# Patient Record
Sex: FEMALE | Race: WHITE | NOT HISPANIC OR LATINO | Employment: FULL TIME | URBAN - METROPOLITAN AREA
[De-identification: names, ages, dates, MRNs, and addresses within clinical notes are randomized per-mention and may not be internally consistent; named-entity substitution may affect disease eponyms.]

---

## 2024-06-24 ENCOUNTER — APPOINTMENT (EMERGENCY)
Dept: RADIOLOGY | Facility: HOSPITAL | Age: 64
DRG: 470 | End: 2024-06-24
Payer: COMMERCIAL

## 2024-06-24 ENCOUNTER — HOSPITAL ENCOUNTER (INPATIENT)
Facility: HOSPITAL | Age: 64
LOS: 4 days | Discharge: HOME | DRG: 470 | End: 2024-06-28
Attending: STUDENT IN AN ORGANIZED HEALTH CARE EDUCATION/TRAINING PROGRAM | Admitting: STUDENT IN AN ORGANIZED HEALTH CARE EDUCATION/TRAINING PROGRAM
Payer: COMMERCIAL

## 2024-06-24 DIAGNOSIS — M80.00XP AGE-RELATED OSTEOPOROSIS WITH CURRENT PATHOLOGICAL FRACTURE WITH MALUNION: ICD-10-CM

## 2024-06-24 DIAGNOSIS — S72.002A LEFT DISPLACED FEMORAL NECK FRACTURE (HCC): ICD-10-CM
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
## 2024-06-24 DIAGNOSIS — S72.002A DISPLACED FRACTURE OF LEFT FEMORAL NECK (HCC): ICD-10-CM

## 2024-06-24 DIAGNOSIS — S72.009A FEMORAL NECK FRACTURE (HCC): ICD-10-CM

## 2024-06-24 LAB
ANION GAP SERPL CALCULATED.3IONS-SCNC: 12 MMOL/L (ref 4–13)
BASOPHILS # BLD AUTO: 0.05 THOUSANDS/ÂΜL (ref 0–0.1)
BASOPHILS NFR BLD AUTO: 1 % (ref 0–1)
BUN SERPL-MCNC: 32 MG/DL (ref 5–25)
CALCIUM SERPL-MCNC: 10 MG/DL (ref 8.4–10.2)
CHLORIDE SERPL-SCNC: 107 MMOL/L (ref 96–108)
CO2 SERPL-SCNC: 23 MMOL/L (ref 21–32)
CREAT SERPL-MCNC: 1.16 MG/DL (ref 0.6–1.3)
EOSINOPHIL # BLD AUTO: 0.03 THOUSAND/ÂΜL (ref 0–0.61)
EOSINOPHIL NFR BLD AUTO: 0 % (ref 0–6)
ERYTHROCYTE [DISTWIDTH] IN BLOOD BY AUTOMATED COUNT: 12.8 % (ref 11.6–15.1)
GFR SERPL CREATININE-BSD FRML MDRD: 50 ML/MIN/1.73SQ M
GLUCOSE SERPL-MCNC: 114 MG/DL (ref 65–140)
HCT VFR BLD AUTO: 45.3 % (ref 34.8–46.1)
HGB BLD-MCNC: 15.6 G/DL (ref 11.5–15.4)
IMM GRANULOCYTES # BLD AUTO: 0.02 THOUSAND/UL (ref 0–0.2)
IMM GRANULOCYTES NFR BLD AUTO: 0 % (ref 0–2)
LYMPHOCYTES # BLD AUTO: 1.51 THOUSANDS/ÂΜL (ref 0.6–4.47)
LYMPHOCYTES NFR BLD AUTO: 14 % (ref 14–44)
MCH RBC QN AUTO: 30.9 PG (ref 26.8–34.3)
MCHC RBC AUTO-ENTMCNC: 34.4 G/DL (ref 31.4–37.4)
MCV RBC AUTO: 90 FL (ref 82–98)
MONOCYTES # BLD AUTO: 1.03 THOUSAND/ÂΜL (ref 0.17–1.22)
MONOCYTES NFR BLD AUTO: 10 % (ref 4–12)
NEUTROPHILS # BLD AUTO: 8.22 THOUSANDS/ÂΜL (ref 1.85–7.62)
NEUTS SEG NFR BLD AUTO: 75 % (ref 43–75)
NRBC BLD AUTO-RTO: 0 /100 WBCS
PLATELET # BLD AUTO: 248 THOUSANDS/UL (ref 149–390)
PMV BLD AUTO: 11.5 FL (ref 8.9–12.7)
POTASSIUM SERPL-SCNC: 3.8 MMOL/L (ref 3.5–5.3)
RBC # BLD AUTO: 5.05 MILLION/UL (ref 3.81–5.12)
SODIUM SERPL-SCNC: 142 MMOL/L (ref 135–147)
WBC # BLD AUTO: 10.86 THOUSAND/UL (ref 4.31–10.16)

## 2024-06-24 PROCEDURE — 36415 COLL VENOUS BLD VENIPUNCTURE: CPT | Performed by: STUDENT IN AN ORGANIZED HEALTH CARE EDUCATION/TRAINING PROGRAM

## 2024-06-24 PROCEDURE — 99285 EMERGENCY DEPT VISIT HI MDM: CPT | Performed by: STUDENT IN AN ORGANIZED HEALTH CARE EDUCATION/TRAINING PROGRAM

## 2024-06-24 PROCEDURE — 96374 THER/PROPH/DIAG INJ IV PUSH: CPT

## 2024-06-24 PROCEDURE — 73552 X-RAY EXAM OF FEMUR 2/>: CPT

## 2024-06-24 PROCEDURE — 85025 COMPLETE CBC W/AUTO DIFF WBC: CPT | Performed by: STUDENT IN AN ORGANIZED HEALTH CARE EDUCATION/TRAINING PROGRAM

## 2024-06-24 PROCEDURE — 80048 BASIC METABOLIC PNL TOTAL CA: CPT | Performed by: STUDENT IN AN ORGANIZED HEALTH CARE EDUCATION/TRAINING PROGRAM

## 2024-06-24 PROCEDURE — 99285 EMERGENCY DEPT VISIT HI MDM: CPT

## 2024-06-24 PROCEDURE — 73590 X-RAY EXAM OF LOWER LEG: CPT

## 2024-06-24 PROCEDURE — 70450 CT HEAD/BRAIN W/O DYE: CPT

## 2024-06-24 PROCEDURE — 99221 1ST HOSP IP/OBS SF/LOW 40: CPT | Performed by: FAMILY MEDICINE

## 2024-06-24 PROCEDURE — 72192 CT PELVIS W/O DYE: CPT

## 2024-06-24 PROCEDURE — 96375 TX/PRO/DX INJ NEW DRUG ADDON: CPT

## 2024-06-24 RX ORDER — ACETAMINOPHEN 325 MG/1
650 TABLET ORAL EVERY 6 HOURS PRN
Status: DISCONTINUED | OUTPATIENT
Start: 2024-06-24 | End: 2024-06-27

## 2024-06-24 RX ORDER — ACETAMINOPHEN 10 MG/ML
1000 INJECTION, SOLUTION INTRAVENOUS ONCE
Status: COMPLETED | OUTPATIENT
Start: 2024-06-24 | End: 2024-06-24

## 2024-06-24 RX ORDER — FENTANYL CITRATE 50 UG/ML
100 INJECTION, SOLUTION INTRAMUSCULAR; INTRAVENOUS ONCE
Status: COMPLETED | OUTPATIENT
Start: 2024-06-24 | End: 2024-06-24

## 2024-06-24 RX ORDER — HYDROMORPHONE HCL/PF 1 MG/ML
0.5 SYRINGE (ML) INJECTION ONCE
Status: COMPLETED | OUTPATIENT
Start: 2024-06-24 | End: 2024-06-24

## 2024-06-24 RX ORDER — MORPHINE SULFATE 4 MG/ML
4 INJECTION, SOLUTION INTRAMUSCULAR; INTRAVENOUS EVERY 4 HOURS PRN
Status: DISCONTINUED | OUTPATIENT
Start: 2024-06-24 | End: 2024-06-27

## 2024-06-24 RX ORDER — HEPARIN SODIUM 5000 [USP'U]/ML
5000 INJECTION, SOLUTION INTRAVENOUS; SUBCUTANEOUS EVERY 8 HOURS SCHEDULED
Status: DISCONTINUED | OUTPATIENT
Start: 2024-06-24 | End: 2024-06-26

## 2024-06-24 RX ORDER — ONDANSETRON 2 MG/ML
4 INJECTION INTRAMUSCULAR; INTRAVENOUS EVERY 6 HOURS PRN
Status: DISCONTINUED | OUTPATIENT
Start: 2024-06-24 | End: 2024-06-28 | Stop reason: HOSPADM

## 2024-06-24 RX ADMIN — ACETAMINOPHEN 1000 MG: 10 INJECTION INTRAVENOUS at 17:01

## 2024-06-24 RX ADMIN — MORPHINE SULFATE 2 MG: 2 INJECTION, SOLUTION INTRAMUSCULAR; INTRAVENOUS at 22:31

## 2024-06-24 RX ADMIN — HEPARIN SODIUM 5000 UNITS: 5000 INJECTION, SOLUTION INTRAVENOUS; SUBCUTANEOUS at 22:32

## 2024-06-24 RX ADMIN — HYDROMORPHONE HYDROCHLORIDE 0.5 MG: 1 INJECTION, SOLUTION INTRAMUSCULAR; INTRAVENOUS; SUBCUTANEOUS at 17:29

## 2024-06-24 RX ADMIN — FENTANYL CITRATE 100 MCG: 50 INJECTION, SOLUTION INTRAMUSCULAR; INTRAVENOUS at 17:02

## 2024-06-24 NOTE — Clinical Note
Case was discussed with  and the patient's admission status was agreed to be Admission Status: observation status to the service of Dr. Chase

## 2024-06-25 ENCOUNTER — APPOINTMENT (INPATIENT)
Dept: RADIOLOGY | Facility: HOSPITAL | Age: 64
DRG: 470 | End: 2024-06-25
Payer: COMMERCIAL

## 2024-06-25 ENCOUNTER — ANESTHESIA EVENT (INPATIENT)
Dept: PERIOP | Facility: HOSPITAL | Age: 64
DRG: 470 | End: 2024-06-25
Payer: COMMERCIAL

## 2024-06-25 LAB
25(OH)D3 SERPL-MCNC: 33.9 NG/ML (ref 30–100)
ABO GROUP BLD: NORMAL
ABO GROUP BLD: NORMAL
ANION GAP SERPL CALCULATED.3IONS-SCNC: 5 MMOL/L (ref 4–13)
BLD GP AB SCN SERPL QL: NEGATIVE
BUN SERPL-MCNC: 27 MG/DL (ref 5–25)
CALCIUM SERPL-MCNC: 9 MG/DL (ref 8.4–10.2)
CHLORIDE SERPL-SCNC: 111 MMOL/L (ref 96–108)
CO2 SERPL-SCNC: 27 MMOL/L (ref 21–32)
CREAT SERPL-MCNC: 0.91 MG/DL (ref 0.6–1.3)
ERYTHROCYTE [DISTWIDTH] IN BLOOD BY AUTOMATED COUNT: 12.6 % (ref 11.6–15.1)
GFR SERPL CREATININE-BSD FRML MDRD: 67 ML/MIN/1.73SQ M
GLUCOSE SERPL-MCNC: 113 MG/DL (ref 65–140)
HCT VFR BLD AUTO: 41.7 % (ref 34.8–46.1)
HGB BLD-MCNC: 14 G/DL (ref 11.5–15.4)
INR PPP: 1.12 (ref 0.84–1.19)
MAGNESIUM SERPL-MCNC: 2 MG/DL (ref 1.9–2.7)
MCH RBC QN AUTO: 31 PG (ref 26.8–34.3)
MCHC RBC AUTO-ENTMCNC: 33.6 G/DL (ref 31.4–37.4)
MCV RBC AUTO: 93 FL (ref 82–98)
PLATELET # BLD AUTO: 202 THOUSANDS/UL (ref 149–390)
PMV BLD AUTO: 11 FL (ref 8.9–12.7)
POTASSIUM SERPL-SCNC: 3.7 MMOL/L (ref 3.5–5.3)
PROTHROMBIN TIME: 14.6 SECONDS (ref 11.6–14.5)
RBC # BLD AUTO: 4.51 MILLION/UL (ref 3.81–5.12)
RH BLD: POSITIVE
RH BLD: POSITIVE
SODIUM SERPL-SCNC: 143 MMOL/L (ref 135–147)
SPECIMEN EXPIRATION DATE: NORMAL
WBC # BLD AUTO: 7.43 THOUSAND/UL (ref 4.31–10.16)

## 2024-06-25 PROCEDURE — 86900 BLOOD TYPING SEROLOGIC ABO: CPT | Performed by: PHYSICIAN ASSISTANT

## 2024-06-25 PROCEDURE — 80048 BASIC METABOLIC PNL TOTAL CA: CPT | Performed by: FAMILY MEDICINE

## 2024-06-25 PROCEDURE — 85610 PROTHROMBIN TIME: CPT | Performed by: FAMILY MEDICINE

## 2024-06-25 PROCEDURE — 93005 ELECTROCARDIOGRAM TRACING: CPT

## 2024-06-25 PROCEDURE — 99223 1ST HOSP IP/OBS HIGH 75: CPT | Performed by: PHYSICIAN ASSISTANT

## 2024-06-25 PROCEDURE — 83735 ASSAY OF MAGNESIUM: CPT | Performed by: FAMILY MEDICINE

## 2024-06-25 PROCEDURE — 82306 VITAMIN D 25 HYDROXY: CPT

## 2024-06-25 PROCEDURE — 86850 RBC ANTIBODY SCREEN: CPT | Performed by: PHYSICIAN ASSISTANT

## 2024-06-25 PROCEDURE — 85027 COMPLETE CBC AUTOMATED: CPT | Performed by: FAMILY MEDICINE

## 2024-06-25 PROCEDURE — 86901 BLOOD TYPING SEROLOGIC RH(D): CPT | Performed by: PHYSICIAN ASSISTANT

## 2024-06-25 PROCEDURE — 99232 SBSQ HOSP IP/OBS MODERATE 35: CPT

## 2024-06-25 RX ORDER — LANOLIN ALCOHOL/MO/W.PET/CERES
1 CREAM (GRAM) TOPICAL
Status: DISCONTINUED | OUTPATIENT
Start: 2024-06-26 | End: 2024-06-28 | Stop reason: HOSPADM

## 2024-06-25 RX ORDER — SODIUM CHLORIDE 9 MG/ML
75 INJECTION, SOLUTION INTRAVENOUS CONTINUOUS
Status: DISPENSED | OUTPATIENT
Start: 2024-06-25 | End: 2024-06-26

## 2024-06-25 RX ORDER — KETOROLAC TROMETHAMINE 30 MG/ML
15 INJECTION, SOLUTION INTRAMUSCULAR; INTRAVENOUS EVERY 6 HOURS SCHEDULED
Status: DISCONTINUED | OUTPATIENT
Start: 2024-06-25 | End: 2024-06-27

## 2024-06-25 RX ADMIN — HEPARIN SODIUM 5000 UNITS: 5000 INJECTION, SOLUTION INTRAVENOUS; SUBCUTANEOUS at 22:28

## 2024-06-25 RX ADMIN — SODIUM CHLORIDE 75 ML/HR: 0.9 INJECTION, SOLUTION INTRAVENOUS at 09:34

## 2024-06-25 RX ADMIN — MORPHINE SULFATE 4 MG: 4 INJECTION INTRAVENOUS at 19:17

## 2024-06-25 RX ADMIN — KETOROLAC TROMETHAMINE 15 MG: 30 INJECTION, SOLUTION INTRAMUSCULAR at 23:41

## 2024-06-25 RX ADMIN — MORPHINE SULFATE 2 MG: 2 INJECTION, SOLUTION INTRAMUSCULAR; INTRAVENOUS at 03:38

## 2024-06-25 RX ADMIN — KETOROLAC TROMETHAMINE 15 MG: 30 INJECTION, SOLUTION INTRAMUSCULAR at 17:55

## 2024-06-25 RX ADMIN — SODIUM CHLORIDE 75 ML/HR: 0.9 INJECTION, SOLUTION INTRAVENOUS at 22:30

## 2024-06-25 RX ADMIN — HEPARIN SODIUM 5000 UNITS: 5000 INJECTION, SOLUTION INTRAVENOUS; SUBCUTANEOUS at 13:00

## 2024-06-25 RX ADMIN — HEPARIN SODIUM 5000 UNITS: 5000 INJECTION, SOLUTION INTRAVENOUS; SUBCUTANEOUS at 05:02

## 2024-06-25 RX ADMIN — KETOROLAC TROMETHAMINE 15 MG: 30 INJECTION, SOLUTION INTRAMUSCULAR at 13:00

## 2024-06-25 RX ADMIN — MORPHINE SULFATE 2 MG: 2 INJECTION, SOLUTION INTRAMUSCULAR; INTRAVENOUS at 07:38

## 2024-06-25 NOTE — ASSESSMENT & PLAN NOTE
Fall from standing upright.  CT head with no intracranial hemorrhage or calvarial fracture  Fall precautions

## 2024-06-25 NOTE — ED PROVIDER NOTES
History  Chief Complaint   Patient presents with    Fall     Pt reported multiple falls over last week or more. Pt c/o left leg/hip pain the most. Denies hitting her head, no LOC, no thinners.      Patient is a 63-year-old female, no pertinent past medical history, who presents the emergency room for left lower extremity pain after multiple falls.  Patient states she has fallen 3 times over the past couple of days, all mechanical.  She states she fell onto her left side each time.  Denies any head strike.  First 1 was after her dog pulled her.  Second 1 was a slip.  States today she was caught by her significant other and did not actually hit the ground..  However, since then has had significant pain to her left hip as well as across her lower back.  Pain is worse with any movement.  No other modifying factors.  No associated symptoms.  No numbness, tingling.  No other complaints or concerns.      Fall      None       History reviewed. No pertinent past medical history.    History reviewed. No pertinent surgical history.    History reviewed. No pertinent family history.  I have reviewed and agree with the history as documented.    E-Cigarette/Vaping    E-Cigarette Use Never User      E-Cigarette/Vaping Substances    Nicotine No     THC No     CBD No     Flavoring No     Other No     Unknown No      Social History     Tobacco Use    Smoking status: Never     Passive exposure: Never    Smokeless tobacco: Never   Vaping Use    Vaping status: Never Used   Substance Use Topics    Alcohol use: Not Currently    Drug use: Not Currently       Review of Systems   Musculoskeletal:         Left hip pain   All other systems reviewed and are negative.      Physical Exam  Physical Exam  Vitals and nursing note reviewed.   Constitutional:       General: She is not in acute distress.     Appearance: She is well-developed. She is not diaphoretic.   HENT:      Head: Normocephalic and atraumatic.      Right Ear: External ear normal.       Left Ear: External ear normal.      Nose: Nose normal.   Eyes:      General: Lids are normal. No scleral icterus.  Cardiovascular:      Rate and Rhythm: Normal rate and regular rhythm.      Heart sounds: Normal heart sounds. No murmur heard.     No friction rub. No gallop.   Pulmonary:      Effort: Pulmonary effort is normal. No respiratory distress.      Breath sounds: Normal breath sounds. No wheezing or rales.   Abdominal:      Palpations: Abdomen is soft.      Tenderness: There is no abdominal tenderness. There is no guarding or rebound.   Musculoskeletal:         General: No deformity. Normal range of motion.      Cervical back: Normal range of motion and neck supple.        Legs:       Comments: Left lower extremity is neurovascularly intact.  Compartments are soft.   Skin:     General: Skin is warm and dry.   Neurological:      General: No focal deficit present.      Mental Status: She is alert.   Psychiatric:         Mood and Affect: Mood normal.         Behavior: Behavior normal.         Vital Signs  ED Triage Vitals [06/24/24 1645]   Temperature Pulse Respirations Blood Pressure SpO2   97.9 °F (36.6 °C) 80 18 (!) 227/103 100 %      Temp Source Heart Rate Source Patient Position - Orthostatic VS BP Location FiO2 (%)   Tympanic Monitor Lying Left arm --      Pain Score       10 - Worst Possible Pain           Vitals:    06/24/24 1645 06/24/24 1906 06/24/24 2019 06/24/24 2038   BP: (!) 227/103 166/77 124/85    Pulse: 80 82  69   Patient Position - Orthostatic VS: Lying Lying           Visual Acuity      ED Medications  Medications   fentaNYL injection 100 mcg (100 mcg Intravenous Given 6/24/24 1702)   acetaminophen (Ofirmev) injection 1,000 mg (0 mg Intravenous Stopped 6/24/24 1716)   HYDROmorphone (DILAUDID) injection 0.5 mg (0.5 mg Intravenous Given 6/24/24 1729)       Diagnostic Studies  Results Reviewed       Procedure Component Value Units Date/Time    Basic metabolic panel [[id number]]  (Abnormal)  Collected: 06/24/24 1750    Lab Status: Final result Specimen: Blood from Arm, Right Updated: 06/24/24 1831     Sodium 142 mmol/L      Potassium 3.8 mmol/L      Chloride 107 mmol/L      CO2 23 mmol/L      ANION GAP 12 mmol/L      BUN 32 mg/dL      Creatinine 1.16 mg/dL      Glucose 114 mg/dL      Calcium 10.0 mg/dL      eGFR 50 ml/min/1.73sq m     Narrative:      National Kidney Disease Foundation guidelines for Chronic Kidney Disease (CKD):     Stage 1 with normal or high GFR (GFR > 90 mL/min/1.73 square meters)    Stage 2 Mild CKD (GFR = 60-89 mL/min/1.73 square meters)    Stage 3A Moderate CKD (GFR = 45-59 mL/min/1.73 square meters)    Stage 3B Moderate CKD (GFR = 30-44 mL/min/1.73 square meters)    Stage 4 Severe CKD (GFR = 15-29 mL/min/1.73 square meters)    Stage 5 End Stage CKD (GFR <15 mL/min/1.73 square meters)  Note: GFR calculation is accurate only with a steady state creatinine    CBC and differential [[id number]]  (Abnormal) Collected: 06/24/24 1750    Lab Status: Final result Specimen: Blood from Arm, Right Updated: 06/24/24 1755     WBC 10.86 Thousand/uL      RBC 5.05 Million/uL      Hemoglobin 15.6 g/dL      Hematocrit 45.3 %      MCV 90 fL      MCH 30.9 pg      MCHC 34.4 g/dL      RDW 12.8 %      MPV 11.5 fL      Platelets 248 Thousands/uL      nRBC 0 /100 WBCs      Segmented % 75 %      Immature Grans % 0 %      Lymphocytes % 14 %      Monocytes % 10 %      Eosinophils Relative 0 %      Basophils Relative 1 %      Absolute Neutrophils 8.22 Thousands/µL      Absolute Immature Grans 0.02 Thousand/uL      Absolute Lymphocytes 1.51 Thousands/µL      Absolute Monocytes 1.03 Thousand/µL      Eosinophils Absolute 0.03 Thousand/µL      Basophils Absolute 0.05 Thousands/µL                    XR femur 2 views LEFT   ED Interpretation by David Lara DO (06/24 1911)   Femur fracture      CT head without contrast   Final Result by Ankit Connor MD (06/24 1909)      No intracranial hemorrhage or calvarial  "fracture.                  Workstation performed: [id number]         CT pelvis wo contrast   Final Result by Ankit Connor MD (06/24 1909)      Comminuted and displaced fracture of the left femoral neck.      The study was marked in EPIC for immediate notification.         Workstation performed: [id number]         XR knee 4+ vw left injury    (Results Pending)   XR tibia fibula 2 views LEFT    (Results Pending)              Procedures  Procedures         ED Course  ED Course as of 06/24/24 2202   Mon Jun 24, 2024   1800 Obvious femoral neck fracture seen on films.  Discussed with SLIM. Accepts admission                                             Medical Decision Making  .Patient is a 63 y.o. female who presents to the ED for lower extremity pain.  Patient is nontoxic, well-appearing.  Vitals are stable.    Differential includes but is not limited to: Femur fracture, contusion.  Presentation not consistent with compartment syndrome.    Plan: CT pelvis, plain films of the left lower extremity, pain control, reassess                 Portions of the record may have been created with voice recognition software. Occasional wrong word or \"sound a like\" substitutions may have occurred due to the inherent limitations of voice recognition software. Read the chart carefully and recognize, using context, where substitutions have occurred.    Problems Addressed:  Fall, initial encounter: acute illness or injury  Femoral neck fracture (HCC): acute illness or injury    Amount and/or Complexity of Data Reviewed  Labs: ordered.  Radiology: ordered and independent interpretation performed.    Risk  Prescription drug management.  Decision regarding hospitalization.             Disposition  Final diagnoses:   Fall, initial encounter   Femoral neck fracture (HCC)     Time reflects when diagnosis was documented in both MDM as applicable and the Disposition within this note       Time User Action Codes Description Comment    6/24/2024  " 7:12 PM David Lara Add [W19.XXXA] Fall, initial encounter     6/24/2024  7:12 PM David Lara Add [S72.009A] Femoral neck fracture (HCC)           ED Disposition       ED Disposition   Admit    Condition   Stable    Date/Time   Mon Jun 24, 2024 10:01 PM    Comment   Case was discussed with MIRTHA and the patient's admission status was agreed to be Admission Status: inpatient status to the service of Dr. Gauthier .               Follow-up Information    None         There are no discharge medications for this patient.      No discharge procedures on file.    PDMP Review       None            ED Provider  Electronically Signed by             David Lara DO  06/24/24 6154

## 2024-06-25 NOTE — PHYSICAL THERAPY NOTE
Physical Therapy Cancellation Note       06/25/24 0759   Note Type   Note type Cancelled Session   Cancel Reasons Medical status   Additional Comments PT orders received and chart reviewed. Per imaging pt with Acute subcapital left hip fracture. Pt pending follow-up with orthopedic surgery. Will hold PT at this time and follow-up as appropriate/tolerated.   Licensure   [place] License Number  Marisol Ni [id number]

## 2024-06-25 NOTE — PROGRESS NOTES
[facility]  Progress Note  Name: Liliana Zheng I  MRN: [id number]  Unit/Bed#: 2 [address] Date of Admission: 6/24/2024   Date of Service: 6/25/2024 I Hospital Day: 1    Assessment & Plan   * Left displaced femoral neck fracture (HCC)  Assessment & Plan  Presented to the ED with left lower extremity pain status post multiple falls  X-ray left tibia-fibula and left femur demonstrated acute subcapital left hip fracture  CT pelvis revealed comminuted and displaced fracture of the left femoral neck  N.p.o. past midnight  Pain management  Consult orthopedics  Continue IV fluids  PT/OT/case management consultation    Fall  Assessment & Plan  Fall from standing upright.  CT head with no intracranial hemorrhage or calvarial fracture  Fall precautions    Age-related osteoporosis with current pathological fracture with malunion  Assessment & Plan  Fragility fracture, this was a ground-level fall.    Follow-up on vitamin D level.  Start vitamin D with calcium               VTE Pharmacologic Prophylaxis: VTE Score: 7 High Risk (Score >/= 5) - Pharmacological DVT Prophylaxis Ordered: heparin. Sequential Compression Devices Ordered.    Mobility:   Basic Mobility Inpatient Raw Score: 17  -[facility] Goal: 5: Stand one or more mins  -HL Achieved: 2: Bed activities/Dependent transfer      Patient Centered Rounds: I performed bedside rounds with nursing staff today.   Discussions with Specialists or Other Care Team Provider: Yes    Education and Discussions with Family / Patient:  Patient will update .     Total Time Spent on Date of Encounter in care of patient:  mins. This time was spent on one or more of the following: performing physical exam; counseling and coordination of care; obtaining or reviewing history; documenting in the medical record; reviewing/ordering tests, medications or procedures; communicating with other healthcare professionals and discussing with patient's  family/caregivers.    Current Length of Stay: 1 day(s)  Current Patient Status: Inpatient   Certification Statement: The patient will continue to require additional inpatient hospital stay due to orthopedic surgery consult  Discharge Plan: Anticipate discharge in 24-48 hrs to discharge location to be determined pending rehab evaluations.    Code Status: Level 1 - Full Code    Subjective:   Seen and examined at bedside.  Patient denies pain at this time as  medication was recently administered.  Verbalized that she is comfortable.  Denies chest pain, shortness of breath, lightheadedness, nausea or vomiting.     Objective:     Vitals:   Temp (24hrs), Av.3 °F (36.8 °C), Min:97.9 °F (36.6 °C), Max:98.6 °F (37 °C)    Temp:  [97.9 °F (36.6 °C)-98.6 °F (37 °C)] 98.4 °F (36.9 °C)  HR:  [68-82] 72  Resp:  [16-18] 16  BP: (124-227)/() 134/79  SpO2:  [96 %-100 %] 96 %  Body mass index is 22.4 kg/m².     Input and Output Summary (last 24 hours):     Intake/Output Summary (Last 24 hours) at 2024 1048  Last data filed at 2024 1716  Gross per 24 hour   Intake 100 ml   Output --   Net 100 ml       Physical Exam:   Physical Exam  Vitals and nursing note reviewed.   Constitutional:       General: She is not in acute distress.     Appearance: She is well-developed.   HENT:      Head: Normocephalic and atraumatic.   Eyes:      Conjunctiva/sclera: Conjunctivae normal.   Cardiovascular:      Rate and Rhythm: Normal rate and regular rhythm.      Heart sounds: No murmur heard.  Pulmonary:      Effort: Pulmonary effort is normal. No respiratory distress.      Breath sounds: Normal breath sounds.   Abdominal:      Palpations: Abdomen is soft.      Tenderness: There is no abdominal tenderness.   Musculoskeletal:         General: No swelling.      Cervical back: Neck supple.      Left lower leg: Swelling and tenderness present.   Skin:     General: Skin is warm and dry.      Capillary Refill: Capillary refill takes less  than 2 seconds.   Neurological:      Mental Status: She is alert.   Psychiatric:         Mood and Affect: Mood normal.          Additional Data:     Labs:  Results from last 7 days   Lab Units 06/25/24  0450 06/24/24  1750   WBC Thousand/uL 7.43 10.86*   HEMOGLOBIN g/dL 14.0 15.6*   HEMATOCRIT % 41.7 45.3   PLATELETS Thousands/uL 202 248   SEGS PCT %  --  75   LYMPHO PCT %  --  14   MONO PCT %  --  10   EOS PCT %  --  0     Results from last 7 days   Lab Units 06/25/24  0450   SODIUM mmol/L 143   POTASSIUM mmol/L 3.7   CHLORIDE mmol/L 111*   CO2 mmol/L 27   BUN mg/dL 27*   CREATININE mg/dL 0.91   ANION GAP mmol/L 5   CALCIUM mg/dL 9.0   GLUCOSE RANDOM mg/dL 113     Results from last 7 days   Lab Units 06/25/24  0450   INR  1.12                   Lines/Drains:  Invasive Devices       Peripheral Intravenous Line  Duration             Peripheral IV 06/24/24 Left Antecubital <1 day                          Imaging: Reviewed radiology reports from this admission including: xray(s)    Recent Cultures (last 7 days):         Last 24 Hours Medication List:   Current Facility-Administered Medications   Medication Dose Route Frequency Provider Last Rate    acetaminophen  650 mg Oral Q6H PRN Wolf Garcia MD      [START ON 6/26/2024] calcium carbonate-vitamin D  1 tablet Oral Daily With Breakfast Wolf Garcia MD      heparin (porcine)  5,000 Units Subcutaneous Q8H [facility] Wolf Garcia MD      ketorolac  15 mg Intravenous Q6H [facility] FLYNN Martin      morphine injection  2 mg Intravenous Q4H PRN Wolf Garcia MD      morphine injection  4 mg Intravenous Q4H PRN Wolf Garcia MD      ondansetron  4 mg Intravenous Q6H PRN Wolf Garcia MD      sodium chloride  75 mL/hr Intravenous Continuous Thad Tomana DO 75 mL/hr (06/25/24 0934)        Today, Patient Was Seen By: FLYNN Matrin    **Please Note: This note may have been constructed using a voice recognition system.**

## 2024-06-25 NOTE — CASE MANAGEMENT
Case Management Assessment & Discharge Planning Note    Patient name Liliana Zheng  Location [address] MRN [id number]  : 1960 Date 2024       Current Admission Date: 2024  Current Admission Diagnosis:Left displaced femoral neck fracture (HCC)   Patient Active Problem List    Diagnosis Date Noted Date Diagnosed    Left displaced femoral neck fracture (HCC) 2024     Age-related osteoporosis with current pathological fracture with malunion 2024     Fall 2024       LOS (days): 1  Geometric Mean LOS (GMLOS) (days):   Days to GMLOS:     OBJECTIVE:    Risk of Unplanned Readmission Score: 6.25     Current admission status: Inpatient  Referral Reason: Other (Post acute needs)    [id number] [place], [place] - [address]  [address]  [facility] [place] [address]  Phone: [phone] Fax: [phone]    Primary Care Provider: No primary care provider on file.    Primary Insurance: BLUE CROSS  Secondary Insurance:     ASSESSMENT:  Active Health Care Proxies    There are no active Health Care Proxies on file.       Advance Directives  Does patient currently have a Health Care decision maker?: Yes, please see Health Care Proxy section  Primary Contact: Yo Zheng    Readmission Root Cause  30 Day Readmission: No    Patient Information  Admitted from:: Home  Mental Status: Alert  During Assessment patient was accompanied by: Spouse  Assessment information provided by:: Patient, Spouse  Primary Caregiver: Self  Support Systems: Spouse/significant other  County of Residence: [place]  What city do you live in?: [place]  Home entry access options. Select all that apply.: Stairs  Number of steps to enter home.: 2 (in back of house)  Type of Current Residence: 3 story home  Upon entering residence, is there a bedroom on the main floor (no further steps)?: No  A bedroom is located on the following floor levels of  residence (select all that apply):: 2nd Floor  Upon entering residence, is there a bathroom on the main floor (no further steps)?: No  Indicate which floors of current residence have a bathroom (select all the apply):: 2nd Floor  Number of steps to 2nd floor from main floor: One Flight  Living Arrangements: Lives w/ Spouse/significant other    Activities of Daily Living Prior to Admission  Functional Status: Independent  Completes ADLs independently?: Yes  Ambulates independently?: Yes  Does patient use assisted devices?: No (was using crutches and cane only after recent injury)  Does patient currently own DME?: Yes  What DME does the patient currently own?: Crutches, Straight Cane  Does patient have a history of Outpatient Therapy (PT/OT)?: No  Does the patient have a history of Short-Term Rehab?: No  Does patient have a history of HHC?: No  Does patient currently have HHC?: No    Patient Information Continued  Income Source: Employed  Does patient have prescription coverage?: Yes ([facility])  Does patient receive dialysis treatments?: Yes  Does patient have a history of substance abuse?: No  Does patient have a history of Mental Health Diagnosis?: No    Means of Transportation  Means of Transport to Appts:: Drives Self      Social Determinants of Health (SDOH)      Flowsheet Row Most Recent Value   Housing Stability    In the last 12 months, was there a time when you were not able to pay the mortgage or rent on time? N   In the past 12 months, how many times have you moved where you were living? 0   At any time in the past 12 months, were you homeless or living in a shelter (including now)? N   Transportation Needs    In the past 12 months, has lack of transportation kept you from medical appointments or from getting medications? no   In the past 12 months, has lack of transportation kept you from meetings, work, or from getting things needed for daily living? No   Food Insecurity    Within the past 12  months, you worried that your food would run out before you got the money to buy more. Never true   Within the past 12 months, the food you bought just didn't last and you didn't have money to get more. Never true   Utilities    In the past 12 months has the electric, gas, oil, or water company threatened to shut off services in your home? No            DISCHARGE DETAILS:    Discharge planning discussed with:: Patient and , Yo Zheng  Freedom of Choice: Yes  Comments - Freedom of Choice: SW met with pt and  to assess needs and discuss plans.  Pt will be having left total hip arthroplasty tomorrow with Dr. Cueva.  SW talked with pt and  about potential discharge options including acute rehab placement pending therapy recommendations.  SW offered ongoing support and assistance with planning.  SW will follow to monitor progress and assist with planning as needed.  CM contacted family/caregiver?: Yes    Contacts  Patient Contacts: Yo Zheng  Relationship to Patient:: Family ()  Contact Method: In Person  Reason/Outcome: Discharge Planning    Other Referral/Resources/Interventions Provided:  Referral Comments: SW will follow to monitor progress and assist with planning based on therapy recommendations after surgery.    Treatment Team Recommendation:  (pending)

## 2024-06-25 NOTE — H&P
[facility]  H&P  Name: Liliana Zheng 63 y.o. female I MRN: [id number]  Unit/Bed#: 2 [address] Date of Admission: 6/24/2024   Date of Service: 6/24/2024 I Hospital Day: 0      Assessment & Plan   * Left displaced femoral neck fracture (HCC)  Assessment & Plan  N.p.o. past midnight  Pain management  Consult orthopedics  IV fluids starting at midnight  PT/OT/case management consultation    Age-related osteoporosis with current pathological fracture with malunion  Assessment & Plan  Fragility fracture, this was a ground-level fall.  Will order vitamin D level.  Start vitamin D with calcium    Fall  Assessment & Plan  Fall from standing upright.             Disposition  #1  N.p.o. past midnight  #2  Consult orthopedics  #3  IV fluids          VTE Prophylaxis: Heparin  / sequential compression device   Code Status: Level 1 - Full Code       Anticipated Length of Stay:  Patient will be admitted on an Inpatient basis with an anticipated length of stay of greater than fall/left hip pain 2 midnights.   Justification for Hospital Stay: Please see detailed plans noted above.    Chief Complaint:     Fall/left side pain  History of Present Illness:  Liliana Zheng is a 63 y.o. female with no past medical history comes into the ER due to left lower extremity pain after multiple falls.  She has fallen 3 times over the last couple days.  All mechanical.  The first fall was after her dog pulled her and then she fell.  The second fall was a slip.  Today she was brought in by her significant other.  She was found to have a femoral neck fracture.        Review of Systems:    Constitutional:  Denies fever or chills   Eyes:  Denies change in visual acuity   HENT:  Denies nasal congestion or sore throat   Respiratory:  Denies cough or shortness of breath   Cardiovascular:  Denies chest pain or edema   GI:  Denies abdominal pain or bloody stools  :  Denies dysuria   Musculoskeletal: Left hip and leg  "pain  Integument:  Denies rash   Neurologic:  Denies headache or sensory changes   Endocrine:  Denies polyuria or polydipsia   Lymphatic:  Denies swollen glands   Psychiatric:  Denies depression or anxiety     Past Medical and Surgical History:   History reviewed. No pertinent past medical history.  History reviewed. No pertinent surgical history.    Meds/Allergies:  No medications prior to admission.       Allergies: No Known Allergies    History:  Marital Status: /Civil Union     Substance Use History:   Social History     Substance and Sexual Activity   Alcohol Use Not Currently     Social History     Tobacco Use   Smoking Status Never    Passive exposure: Never   Smokeless Tobacco Never     Social History     Substance and Sexual Activity   Drug Use Not Currently       Family History:  Family History   Problem Relation Age of Onset    Stroke Mother     Heart attack Father        Physical Exam:     Vitals:   Blood Pressure: 124/85 (06/24/24 2019)  Pulse: 69 (06/24/24 2038)  Temperature: 98.1 °F (36.7 °C) (06/24/24 2019)  Temp Source: Tympanic (06/24/24 1645)  Respirations: 16 (06/24/24 2038)  Height: 5' 8\" (172.7 cm) (06/24/24 2037)  Weight - Scale: 66.8 kg (147 lb 4.8 oz) (06/24/24 2037)  SpO2: 97 % (06/24/24 1906)    Constitutional:  Non-toxic appearance  Eyes:  EOMI, No scleral icterus   HENT:   oropharynx moist, external ears normal, external nose normal   Respiratory:  No respiratory distress, no wheezing   Cardiovascular:  Normal rate, no murmurs   GI:  Soft, nondistended, no guarding   :  No costovertebral angle tenderness   Musculoskeletal: Pain upon palpation of the left hip area  Integument:  no jaundice, no rash   Neurologic:  Alert &awake, communicative, CN 2-12 normal,  no focal deficits noted   Psychiatric:  Speech and behavior appropriate       Lab Results: I have personally reviewed pertinent reports.   and I have personally reviewed pertinent films in PACS    Results from last 7 days "   Lab Units 06/24/24  1750   WBC Thousand/uL 10.86*   HEMOGLOBIN g/dL 15.6*   HEMATOCRIT % 45.3   PLATELETS Thousands/uL 248   SEGS PCT % 75   LYMPHO PCT % 14   MONO PCT % 10   EOS PCT % 0     Results from last 7 days   Lab Units 06/24/24  1750   POTASSIUM mmol/L 3.8   CHLORIDE mmol/L 107   CO2 mmol/L 23   BUN mg/dL 32*   CREATININE mg/dL 1.16   CALCIUM mg/dL 10.0           Imaging: I have personally reviewed pertinent reports.   and I have personally reviewed pertinent films in PACS    CT head without contrast    Result Date: 6/24/2024  Narrative: CT BRAIN - WITHOUT CONTRAST INDICATION:   fall, on ASA. COMPARISON:  None. TECHNIQUE:  CT examination of the brain was performed.  Multiplanar 2D reformatted images were created from the source data. Radiation dose length product (DLP) for this visit:  910.81 mGy-cm .  This examination, like all CT scans performed in the [facility], was performed utilizing techniques to minimize radiation dose exposure, including the use of iterative  reconstruction and automated exposure control. IMAGE QUALITY:  Diagnostic. FINDINGS: PARENCHYMA:  No intracranial mass, mass effect or midline shift. No CT signs of acute infarction.  No acute parenchymal hemorrhage. VENTRICLES AND EXTRA-AXIAL SPACES:  Normal for the patient's age. VISUALIZED ORBITS: Normal visualized orbits. PARANASAL SINUSES: Normal visualized paranasal sinuses. CALVARIUM AND EXTRACRANIAL SOFT TISSUES:  Normal.     Impression: No intracranial hemorrhage or calvarial fracture. Workstation performed: [id number]     CT pelvis wo contrast    Result Date: 6/24/2024  Narrative: CT PELVIS WITHOUT IV CONTRAST INDICATION: fall, lower back pain, Left hip pain. COMPARISON: None. TECHNIQUE: CT examination of the pelvis was performed without intravenous contrast. Multiplanar 2D reformatted images were created from the source data. This examination, like all CT scans performed in the [facility], was  performed utilizing techniques to minimize radiation dose exposure, including the use of iterative reconstruction and automated exposure control. Radiation dose length product (DLP) for this visit: 441.52 mGy-cm . Enteric Contrast: Not administered. FINDINGS: VISUALIZED KIDNEYS/URETERS: No significant abnormality in the partially imaged kidneys and ureters. REPRODUCTIVE ORGANS: Unremarkable for patient's age. URINARY BLADDER: Unremarkable. VISUALIZED BOWEL: Scattered colonic diverticulosis without evidence of diverticulitis. APPENDIX: Normal. ABDOMINOPELVIC CAVITY: No ascites. No pneumoperitoneum. No lymphadenopathy. VESSELS: Unremarkable for patient's age. ABDOMINOPELVIC WALL/INGUINAL REGIONS: Unremarkable. BONES: There is a comminuted and displaced fracture of the left femoral neck.     Impression: Comminuted and displaced fracture of the left femoral neck. The study was marked in EPIC for immediate notification. Workstation performed: [id number]         Medical decision making: Low  Diagnosis addressed: 1 acute uncomplicated injury: Left femoral neck fracture  Data:   Reviewed  CBC, CMP, x-rays  Ordered CBC, BMP,, mag level  Independent interpretation of imaging: I did review the x-rays myself, which shows a left femoral neck fracture.  Risk:  Prescription drug management: IV fluids  Initiation of parenteral controlled substances: IV morphine                   Epic Records Reviewed as well as Records in Care Everywhere    ** Please Note: Dragon 360 Dictation voice to text software was used in the creation of this document. **

## 2024-06-25 NOTE — ASSESSMENT & PLAN NOTE
Fragility fracture, this was a ground-level fall.  Will order vitamin D level.  Start vitamin D with calcium

## 2024-06-25 NOTE — CONSULTS
Consultation - Orthopedics   Liliana Zheng 63 y.o. female MRN: [id number]  Unit/Bed#: 2 [address] Encounter: [id number]        History of Present Illness   Physician Requesting Consult: Thad Gauthier DO  Reason for Consult / Principal Problem: Left femoral neck fracture    HPI:   Liliana Zheng is a 63 y.o. year old female injured her hip 3 week ago when she sustained a fall when her dog pulled her down. She has had progressively worsening left hip and difficulty ambulating since that time. She had been using crutches intermittently due to her pain.She had another fall on some water at her house after this initial fall. As her pain was not improving, she presented to the ED yesterday. Xrays taken in the ED showed a displaced femoral neck fracture. She notes ongoing pain about the hip, which is worse with movement or weightbearing and better with rest. She notes good sensation of the left lower extremity and denies any calf pain or cramping. She lives at home with her .     Inpatient consult to Orthopedic Surgery  Consult performed by: Erik Holm PA-C  Consult ordered by: Wolf Garcia MD          Review of Systems   Constitutional: Negative.  Negative for chills and fever.   HENT: Negative.  Negative for ear pain and sore throat.    Eyes: Negative.  Negative for pain and redness.   Respiratory: Negative.  Negative for shortness of breath and wheezing.    Cardiovascular:  Negative for chest pain and palpitations.   Gastrointestinal: Negative.  Negative for abdominal pain and blood in stool.   Endocrine: Negative.  Negative for polydipsia and polyuria.   Genitourinary: Negative.  Negative for difficulty urinating and dysuria.   Musculoskeletal:         As noted in HPI   Skin: Negative.  Negative for pallor and rash.   Neurological: Negative.  Negative for dizziness and numbness.   Hematological: Negative.  Negative for adenopathy. Does not bruise/bleed easily.   Psychiatric/Behavioral:  "Negative.  Negative for confusion and suicidal ideas.        Historical Information   History reviewed. No pertinent past medical history.  History reviewed. No pertinent surgical history.  Social History   Social History     Substance and Sexual Activity   Alcohol Use Not Currently     Social History     Substance and Sexual Activity   Drug Use Not Currently     Social History     Tobacco Use   Smoking Status Never    Passive exposure: Never   Smokeless Tobacco Never     Family History:   Family History   Problem Relation Age of Onset    Stroke Mother     Heart attack Father        Meds/Allergies   all current active meds have been reviewed and current meds:   Current Facility-Administered Medications   Medication Dose Route Frequency    acetaminophen (TYLENOL) tablet 650 mg  650 mg Oral Q6H PRN    [START ON 6/26/2024] calcium carbonate-vitamin D 500 mg-5 mcg tablet 1 tablet  1 tablet Oral Daily With Breakfast    heparin (porcine) subcutaneous injection 5,000 Units  5,000 Units Subcutaneous Q8H CHLOÉ    ketorolac (TORADOL) injection 15 mg  15 mg Intravenous Q6H CHLOÉ    morphine injection 2 mg  2 mg Intravenous Q4H PRN    morphine injection 4 mg  4 mg Intravenous Q4H PRN    ondansetron (ZOFRAN) injection 4 mg  4 mg Intravenous Q6H PRN    sodium chloride 0.9 % infusion  75 mL/hr Intravenous Continuous     No Known Allergies    Objective   Vitals: Blood pressure 137/81, pulse 77, temperature 98.7 °F (37.1 °C), resp. rate 18, height 5' 8\" (1.727 m), weight 66.8 kg (147 lb 4.8 oz), SpO2 97%.,Body mass index is 22.4 kg/m².    Invasive Devices       Peripheral Intravenous Line  Duration             Peripheral IV 06/25/24 Dorsal (posterior);Right Forearm <1 day                    Physical Exam  Constitutional:       General: She is not in acute distress.     Appearance: She is well-developed.   HENT:      Head: Normocephalic and atraumatic.   Eyes:      General: No scleral icterus.     Conjunctiva/sclera: Conjunctivae normal. "   Neck:      Vascular: No JVD.   Cardiovascular:      Rate and Rhythm: Normal rate.   Pulmonary:      Effort: Pulmonary effort is normal. No respiratory distress.   Musculoskeletal:      Comments: As per HPI   Skin:     General: Skin is warm.   Neurological:      Mental Status: She is alert and oriented to person, place, and time.      Coordination: Coordination normal.          Ortho Exam  Left lower extremity: Leg shortened and externally rotated compared to the contralateral side. Pain with movement of left hip. Sensation intact LLE. 2+ DP pulse. No calf tenderness.     Lab Results: I have personally reviewed pertinent lab results.  CBC:   Lab Results   Component Value Date    WBC 7.43 06/25/2024    HGB 14.0 06/25/2024    HCT 41.7 06/25/2024    MCV 93 06/25/2024     06/25/2024    RBC 4.51 06/25/2024    MCH 31.0 06/25/2024    MCHC 33.6 06/25/2024    RDW 12.6 06/25/2024    MPV 11.0 06/25/2024    NRBC 0 06/24/2024     CMP:   Lab Results   Component Value Date     (H) 06/25/2024    CO2 27 06/25/2024    BUN 27 (H) 06/25/2024    CREATININE 0.91 06/25/2024    CALCIUM 9.0 06/25/2024    EGFR 67 06/25/2024     PT/INR:   Lab Results   Component Value Date    INR 1.12 06/25/2024       Imaging Studies: I have personally reviewed pertinent reports.   and I have personally reviewed pertinent films in PACS  Xrays left hip and pelvis: Displaced femoral neck fracture.         Assessment:  Left displaced femoral neck fracture.   Plan:  The patient has a left femoral neck fracture that she likely sustained 3 weeks ago. Dr. Cueva will perform left direct anterior left total hip arthroplasty tomorrow. She will be NPO after midnight for OR tomorrow. She will remain nonweightbearing. Details of surgeries discussed with patient. Surgical consent will be signed tomorrow with operating surgeon.       Code Status: Level 1 - Full Code  Advance Directive and Living Will:      Power of :    POLST:

## 2024-06-25 NOTE — ANESTHESIA PREPROCEDURE EVALUATION
Procedure:  ARTHROPLASTY LEFT HIP TOTAL ANTERIOR (Left: Hip)    Relevant Problems   Orthopedic/Musculoskeletal   (+) Age-related osteoporosis with current pathological fracture with malunion   (+) Left displaced femoral neck fracture (HCC)      Other   (+) Fall        Physical Exam    Airway    Mallampati score: II  TM Distance: >3 FB  Neck ROM: full     Dental       Cardiovascular  Rhythm: regular, Rate: normal    Pulmonary   Breath sounds clear to auscultation    Other Findings  post-pubertal.      Anesthesia Plan  ASA Score- 2 Emergent    Anesthesia Type- general with ASA Monitors.         Additional Monitors:     Airway Plan: ETT.    Comment: GA with ETT;  post-procedure PENG block prior to wake up.       Plan Factors-    Chart reviewed.        Patient is not a current smoker.              Induction- intravenous.    Postoperative Plan- Plan for postoperative opioid use.     Perioperative Resuscitation Plan - Level 1 - Full Code.       Informed Consent- Anesthetic plan and risks discussed with patient.  I personally reviewed this patient with the CRNA. Discussed and agreed on the Anesthesia Plan with the CRNA..

## 2024-06-25 NOTE — OCCUPATIONAL THERAPY NOTE
Occupational Therapy Cancellation     Patient Name: Liliana Zheng  Today's Date: 6/25/2024  Problem List  Principal Problem:    Left displaced femoral neck fracture (HCC)  Active Problems:    Age-related osteoporosis with current pathological fracture with malunion    Fall    Past Medical History  History reviewed. No pertinent past medical history.  Past Surgical History  History reviewed. No pertinent surgical history.        06/25/24 0822   Note Type   Note type Cancelled Session   Cancel Reasons Medical status  (pend orthopedics consult for mgmt of L displaced femoral neck fracture)   Additional Comments OT orders received and chart review completed. Will cancel and continue to follow as appropriate following consult.   Licensure   [place] License Number  Starr Tellez, OTR/L [id number]     Starr Tellez OTR/L  [id number]  [id number]

## 2024-06-25 NOTE — ASSESSMENT & PLAN NOTE
N.p.o. past midnight  Pain management  Consult orthopedics  IV fluids starting at midnight  PT/OT/case management consultation

## 2024-06-25 NOTE — UTILIZATION REVIEW
Initial Clinical Review    Admission: Date/Time/Statement:   Admission Orders (From admission, onward)       Ordered        06/24/24 1912  INPATIENT ADMISSION  Once                          Orders Placed This Encounter   Procedures    INPATIENT ADMISSION     Standing Status:   Standing     Number of Occurrences:   1     Order Specific Question:   Level of Care     Answer:   Med Surg [16]     Order Specific Question:   Estimated length of stay     Answer:   More than 2 Midnights     Order Specific Question:   Certification     Answer:   I certify that inpatient services are medically necessary for this patient for a duration of greater than two midnights. See H&P and MD Progress Notes for additional information about the patient's course of treatment.     ED Arrival Information       Expected   -    Arrival   6/24/2024 16:39    Acuity   Urgent              Means of arrival   [facility]    Admission type   Emergency              Arrival complaint   Fall; Left Leg             Chief Complaint   Patient presents with    Fall     Pt reported multiple falls over last week or more. Pt c/o left leg/hip pain the most. Denies hitting her head, no LOC, no thinners.        Initial Presentation:   63 yof to ER from home via EMS c/o LLE, hip, back, s/p 3 mechanical falls past couple days. Reports pain worse with movement. Presents with tenderness noted L hip, L anterior thigh & L knee, neurovascularly intact. Admission work-up showing Acute subcapital left hip fracture. Labs: WBC 10.86, Hgb 15.6, BUN 32, PT 14.6.  Admitted to inpatient status for L displaced femoral neck fx. Pain mgt, ortho consulted.      Date: 6/25/24   Day 2:   L displaced femoral neck fx. Persistent swelling & tenderness LLE. Continue pain mgt. Plan OR tomorrow per ortho.    Per ortho: displaced L femoral neck fracture  Ortho Exam  Left lower extremity: Leg shortened and externally rotated compared to the  contralateral side. Pain with movement of left hip. Sensation intact LLE. 2+ DP pulse. No calf tenderness.    Plan L hip arthroplasty tomorrow. Continue medical optimization, pain mgt.       ED Triage Vitals [06/24/24 1645]   Temperature Pulse Respirations Blood Pressure SpO2 Pain Score   97.9 °F (36.6 °C) 80 18 (!) 227/103 100 % 10 - Worst Possible Pain     Weight (last 2 days)       Date/Time Weight    06/24/24 2037 66.8 (147.3)            Vital Signs (last 3 days)       Date/Time Temp Pulse Resp BP MAP (mmHg) SpO2 O2 Device Patient Position - Orthostatic VS Pain    06/26/24 0605 -- -- -- -- -- -- -- -- 2    06/26/24 0426 -- -- -- -- -- -- -- -- 7 06/25/24 2341 -- -- -- -- -- -- -- -- 3    06/25/24 22:41:42 98.2 °F (36.8 °C) 78 18 140/81 101 96 % None (Room air) Lying --    06/25/24 19:45:45 98.6 °F (37 °C) 73 18 162/86 111 97 % None (Room air) Lying 5 06/25/24 1917 -- -- -- -- -- -- -- -- 7 06/25/24 1755 -- -- -- -- -- -- -- -- 3    06/25/24 15:18:20 98.7 °F (37.1 °C) 77 18 137/81 100 97 % -- -- --    06/25/24 1300 -- -- -- -- -- -- -- -- 4 06/25/24 0738 -- -- -- -- -- -- -- -- 5 06/25/24 0338 -- -- -- -- -- -- -- -- 5 06/25/24 03:01:51 98.4 °F (36.9 °C) 72 -- 134/79 97 96 % -- -- --    06/24/24 23:42:38 98.6 °F (37 °C) 68 -- 129/84 99 96 % -- -- --    06/24/24 2038 -- 69 16 -- -- -- -- -- --    06/24/24 2027 -- -- -- -- -- -- -- -- 2    06/24/24 20:19:42 98.1 °F (36.7 °C) -- -- 124/85 98 -- -- -- --    06/24/24 1906 -- 82 18 166/77 110 97 % None (Room air) Lying --    06/24/24 1729 -- -- -- -- -- -- -- -- 10 - Worst Possible Pain    06/24/24 1645 97.9 °F (36.6 °C) 80 18 227/103 -- 100 % None (Room air) Lying 10 - Worst Possible Pain              Pertinent Labs/Diagnostic Test Results:   Radiology:  XR femur 2 views LEFT   ED Interpretation by David Lara DO (06/24 1911)   Femur fracture      Final Interpretation by Faisal Echols MD (06/25 0741)      Acute subcapital left hip  fracture.      Workstation performed: [id number]         XR tibia fibula 2 views LEFT   Final Interpretation by Faisal Echols MD (06/25 0741)      Acute subcapital left hip fracture.      Workstation performed: [id number]         CT head without contrast   Final Interpretation by Ankit Connor MD (06/24 1909)      No intracranial hemorrhage or calvarial fracture.                  Workstation performed: [id number]         CT pelvis wo contrast   Final Interpretation by Ankit Connor MD (06/24 1909)      Comminuted and displaced fracture of the left femoral neck.      The study was marked in EPIC for immediate notification.         Workstation performed: [id number]           Cardiology:  ECG 12 lead   Final Result by Ajith Dunne MD (06/26 0819)   Normal sinus rhythm   Low voltage QRS   Incomplete right bundle branch block   Left anterior fascicular block   Nonspecific T wave abnormality   Abnormal ECG   No previous ECGs available   Confirmed by Ajith Dunne ([id number]) on 6/26/2024 8:19:16 AM        Results from last 7 days   Lab Units 06/25/24  0450 06/24/24  1750   WBC Thousand/uL 7.43 10.86*   HEMOGLOBIN g/dL 14.0 15.6*   HEMATOCRIT % 41.7 45.3   PLATELETS Thousands/uL 202 248   TOTAL NEUT ABS Thousands/µL  --  8.22*     Results from last 7 days   Lab Units 06/25/24  0450 06/24/24  1750   SODIUM mmol/L 143 142   POTASSIUM mmol/L 3.7 3.8   CHLORIDE mmol/L 111* 107   CO2 mmol/L 27 23   ANION GAP mmol/L 5 12   BUN mg/dL 27* 32*   CREATININE mg/dL 0.91 1.16   EGFR ml/min/1.73sq m 67 50   CALCIUM mg/dL 9.0 10.0   MAGNESIUM mg/dL 2.0  --      Results from last 7 days   Lab Units 06/25/24  0450 06/24/24  1750   GLUCOSE RANDOM mg/dL 113 114     Results from last 7 days   Lab Units 06/25/24  0450   PROTIME seconds 14.6*   INR  1.12     ED Treatment-Medication Administration from 06/24/2024 1639 to 06/24/2024 2009         Date/Time Order Dose Route Action     06/24/2024 1702 fentaNYL injection 100 mcg 100 mcg Intravenous  Given     06/24/2024 1701 acetaminophen (Ofirmev) injection 1,000 mg 1,000 mg Intravenous New Bag     06/24/2024 1729 HYDROmorphone (DILAUDID) injection 0.5 mg 0.5 mg Intravenous Given            History reviewed. No pertinent past medical history.  Present on Admission:   Left displaced femoral neck fracture (HCC)   Age-related osteoporosis with current pathological fracture with malunion   Fall      Admitting Diagnosis: Femoral neck fracture (HCC) [S72.009A]  Hip injury [S79.919A]  Fall, initial encounter [W19.XXXA]  Age/Sex: 63 y.o. female  Admission Orders:  Scheduled Medications:  Medications 06/17 06/18 06/19 06/20 06/21 06/22 06/23 06/24 06/25 06/26   acetaminophen (Ofirmev) injection 1,000 mg  Dose: 1,000 mg  Freq: Once Route: IV  Last Dose: Stopped (06/24/24 1716)  Start: 06/24/24 1700 End: 06/24/24 1716   Admin Instructions:              1701     1716          calcium carbonate-vitamin D 500 mg-5 mcg tablet 1 tablet  Dose: 1 tablet  Freq: Daily with breakfast Route: PO  Start: 06/26/24 0730   Admin Instructions:                (0727)        fentaNYL injection 100 mcg  Dose: 100 mcg  Freq: Once Route: IV  Start: 06/24/24 1700 End: 06/24/24 1702   Admin Instructions:              1702          heparin (porcine) subcutaneous injection 5,000 Units  Dose: 5,000 Units  Freq: Every 8 hours scheduled Route: SC  Start: 06/24/24 2215   Admin Instructions:              2232      0502     1300     2228 [C]      0605     1400     2200        HYDROmorphone (DILAUDID) injection 0.5 mg  Dose: 0.5 mg  Freq: Once Route: IV  Start: 06/24/24 1730 End: 06/24/24 1729   Admin Instructions:              1729          ketorolac (TORADOL) injection 15 mg  Dose: 15 mg  Freq: Every 6 hours scheduled Route: IV  Start: 06/25/24 1200 End: 06/28/24 1159   Admin Instructions:               1300     1755     2341      0605     1200     1800        Legend:       Ghnjxlnlanu21/1706/1806/1906/2006/2106/2206/2306/2406/2506/26        Continuous  Meds Sorted by Name  for Liliana Zheng as of 06/17/24 through 6/26/24  Legend:       Medications 06/17 06/18 06/19 06/20 06/21 06/22 06/23 06/24 06/25 06/26   sodium chloride 0.9 % infusion  Rate: 75 mL/hr Dose: 75 mL/hr  Freq: Continuous Route: IV  Last Dose: 75 mL/hr (06/25/24 2230)  Start: 06/25/24 0800 End: 06/26/24 0933            0934     2230      0933-D/C'd                  PRN Meds Sorted by Name  for Liliana Zheng as of 06/17/24 through 6/26/24  Legend:         Medications 06/17 06/18 06/19 06/20 06/21 06/22 06/23 06/24 06/25 06/26   acetaminophen (TYLENOL) tablet 650 mg  Dose: 650 mg  Freq: Every 6 hours PRN Route: PO  PRN Reasons: mild pain,headaches,fever  Indications of Use: FEVER,HEADACHE,MILD PAIN  Start: 06/24/24 2207                morphine injection 2 mg  Dose: 2 mg  Freq: Every 4 hours PRN Route: IV  PRN Reason: moderate pain  Start: 06/24/24 2209   Admin Instructions:              2231      0338     0738         morphine injection 4 mg  Dose: 4 mg  Freq: Every 4 hours PRN Route: IV  PRN Reason: severe pain  Start: 06/24/24 2209   Admin Instructions:               1917      0426        ondansetron (ZOFRAN) injection 4 mg  Dose: 4 mg  Freq: Every 6 hours PRN Route: IV  PRN Reasons: nausea,vomiting  Start: 06/24/24 2209   Admin Instructions:                     Network Utilization Review Department  ATTENTION: Please call with any questions or concerns to [phone] and carefully listen to the prompts so that you are directed to the right person. All voicemails are confidential.   For Discharge needs, contact Care Management DC Support Team at [phone] opt. 2  Send all requests for admission clinical reviews, approved or denied determinations and any other requests to dedicated fax number below belonging to the campus where the patient is receiving treatment. List of dedicated fax numbers for the Facilities:  FACILITY NAME UR FAX NUMBER   ADMISSION DENIALS (Administrative/Medical  Necessity) [phone]   DISCHARGE SUPPORT TEAM (NETWORK) [phone]   PARENT CHILD HEALTH (Maternity/NICU/Pediatrics) [phone]   [facility] [phone]   [facility] [phone]   [facility] [phone]   [facility] [phone]   [facility] [phone]   [facility] [phone]   [facility] [phone]   [facility] [phone]   [facility] [phone]   [facility] [phone]   [facility] [phone]   [facility] [phone]

## 2024-06-25 NOTE — ASSESSMENT & PLAN NOTE
Presented to the ED with left lower extremity pain status post multiple falls  X-ray left tibia-fibula and left femur demonstrated acute subcapital left hip fracture  CT pelvis revealed comminuted and displaced fracture of the left femoral neck  N.p.o. past midnight  Pain management  Consult orthopedics  Continue IV fluids  PT/OT/case management consultation

## 2024-06-25 NOTE — ASSESSMENT & PLAN NOTE
Fragility fracture, this was a ground-level fall.    Follow-up on vitamin D level.  Start vitamin D with calcium

## 2024-06-25 NOTE — PLAN OF CARE
Problem: PAIN - ADULT  Goal: Verbalizes/displays adequate comfort level or baseline comfort level  Description: Interventions:  - Encourage patient to monitor pain and request assistance  - Assess pain using appropriate pain scale  - Administer analgesics based on type and severity of pain and evaluate response  - Implement non-pharmacological measures as appropriate and evaluate response  - Consider cultural and social influences on pain and pain management  - Notify physician/advanced practitioner if interventions unsuccessful or patient reports new pain  Outcome: Progressing     Problem: SAFETY ADULT  Goal: Patient will remain free of falls  Description: INTERVENTIONS:  - Educate patient/family on patient safety including physical limitations  - Instruct patient to call for assistance with activity   - Consult OT/PT to assist with strengthening/mobility   - Keep Call bell within reach  - Keep bed low and locked with side rails adjusted as appropriate  - Keep care items and personal belongings within reach  - Initiate and maintain comfort rounds  - Make Fall Risk Sign visible to staff  - Offer Toileting every 2 Hours, in advance of need  - Initiate/Maintain bed alarm  - Obtain necessary fall risk management equipment: nonskid socks, bed alarm in use, call bell within reach, fall risk sign visible  - Apply yellow socks and bracelet for high fall risk patients  - Consider moving patient to room near nurses station  Outcome: Progressing  Goal: Maintain or return to baseline ADL function  Description: INTERVENTIONS:  -  Assess patient's ability to carry out ADLs; assess patient's baseline for ADL function and identify physical deficits which impact ability to perform ADLs (bathing, care of mouth/teeth, toileting, grooming, dressing, etc.)  - Assess/evaluate cause of self-care deficits   - Assess range of motion  - Assess patient's mobility; develop plan if impaired  - Assess patient's need for assistive devices and  provide as appropriate  - Encourage maximum independence but intervene and supervise when necessary  - Involve family in performance of ADLs  - Assess for home care needs following discharge   - Consider OT consult to assist with ADL evaluation and planning for discharge  - Provide patient education as appropriate  Outcome: Progressing  Goal: Maintains/Returns to pre admission functional level  Description: INTERVENTIONS:  - Perform AM-PAC 6 Click Basic Mobility/ Daily Activity assessment daily.  - Set and communicate daily mobility goal to care team and patient/family/caregiver.   - Collaborate with rehabilitation services on mobility goals if consulted  - Out of bed for toileting  - Record patient progress and toleration of activity level   Outcome: Progressing

## 2024-06-26 ENCOUNTER — APPOINTMENT (INPATIENT)
Dept: RADIOLOGY | Facility: HOSPITAL | Age: 64
DRG: 470 | End: 2024-06-26
Payer: COMMERCIAL

## 2024-06-26 ENCOUNTER — ANESTHESIA (INPATIENT)
Dept: PERIOP | Facility: HOSPITAL | Age: 64
DRG: 470 | End: 2024-06-26
Payer: COMMERCIAL

## 2024-06-26 LAB
ATRIAL RATE: 76 BPM
GLUCOSE SERPL-MCNC: 93 MG/DL (ref 65–140)
P AXIS: 38 DEGREES
PR INTERVAL: 158 MS
QRS AXIS: -46 DEGREES
QRSD INTERVAL: 98 MS
QT INTERVAL: 404 MS
QTC INTERVAL: 454 MS
T WAVE AXIS: 47 DEGREES
VENTRICULAR RATE: 76 BPM

## 2024-06-26 PROCEDURE — 93010 ELECTROCARDIOGRAM REPORT: CPT | Performed by: INTERNAL MEDICINE

## 2024-06-26 PROCEDURE — 82948 REAGENT STRIP/BLOOD GLUCOSE: CPT

## 2024-06-26 PROCEDURE — NC001 PR NO CHARGE: Performed by: PHYSICIAN ASSISTANT

## 2024-06-26 PROCEDURE — 99232 SBSQ HOSP IP/OBS MODERATE 35: CPT | Performed by: INTERNAL MEDICINE

## 2024-06-26 PROCEDURE — NC001 PR NO CHARGE: Performed by: ORTHOPAEDIC SURGERY

## 2024-06-26 RX ORDER — PROPOFOL 10 MG/ML
INJECTION, EMULSION INTRAVENOUS AS NEEDED
Status: DISCONTINUED | OUTPATIENT
Start: 2024-06-26 | End: 2024-06-26

## 2024-06-26 RX ORDER — TRANEXAMIC ACID 10 MG/ML
1000 INJECTION, SOLUTION INTRAVENOUS ONCE
Status: COMPLETED | OUTPATIENT
Start: 2024-06-26 | End: 2024-06-26

## 2024-06-26 RX ORDER — SODIUM CHLORIDE, SODIUM LACTATE, POTASSIUM CHLORIDE, CALCIUM CHLORIDE 600; 310; 30; 20 MG/100ML; MG/100ML; MG/100ML; MG/100ML
INJECTION, SOLUTION INTRAVENOUS CONTINUOUS PRN
Status: DISCONTINUED | OUTPATIENT
Start: 2024-06-26 | End: 2024-06-26

## 2024-06-26 RX ORDER — MEPERIDINE HYDROCHLORIDE 25 MG/ML
12.5 INJECTION INTRAMUSCULAR; INTRAVENOUS; SUBCUTANEOUS
Status: DISCONTINUED | OUTPATIENT
Start: 2024-06-26 | End: 2024-06-26 | Stop reason: HOSPADM

## 2024-06-26 RX ORDER — ROCURONIUM BROMIDE 10 MG/ML
INJECTION, SOLUTION INTRAVENOUS AS NEEDED
Status: DISCONTINUED | OUTPATIENT
Start: 2024-06-26 | End: 2024-06-26

## 2024-06-26 RX ORDER — CEFAZOLIN SODIUM 2 G/50ML
2000 SOLUTION INTRAVENOUS EVERY 8 HOURS
Status: CANCELLED | OUTPATIENT
Start: 2024-06-27 | End: 2024-06-27

## 2024-06-26 RX ORDER — SODIUM CHLORIDE 9 MG/ML
INJECTION, SOLUTION INTRAVENOUS AS NEEDED
Status: DISCONTINUED | OUTPATIENT
Start: 2024-06-26 | End: 2024-06-26 | Stop reason: HOSPADM

## 2024-06-26 RX ORDER — PROMETHAZINE HYDROCHLORIDE 25 MG/ML
12.5 INJECTION, SOLUTION INTRAMUSCULAR; INTRAVENOUS ONCE AS NEEDED
Status: DISCONTINUED | OUTPATIENT
Start: 2024-06-26 | End: 2024-06-26 | Stop reason: HOSPADM

## 2024-06-26 RX ORDER — CEFAZOLIN SODIUM 2 G/50ML
2000 SOLUTION INTRAVENOUS ONCE
Status: CANCELLED | OUTPATIENT
Start: 2024-06-26 | End: 2024-06-26

## 2024-06-26 RX ORDER — MAGNESIUM HYDROXIDE 1200 MG/15ML
LIQUID ORAL AS NEEDED
Status: DISCONTINUED | OUTPATIENT
Start: 2024-06-26 | End: 2024-06-26 | Stop reason: HOSPADM

## 2024-06-26 RX ORDER — HYDROMORPHONE HCL/PF 1 MG/ML
0.5 SYRINGE (ML) INJECTION
Status: DISCONTINUED | OUTPATIENT
Start: 2024-06-26 | End: 2024-06-26 | Stop reason: HOSPADM

## 2024-06-26 RX ORDER — ONDANSETRON 2 MG/ML
4 INJECTION INTRAMUSCULAR; INTRAVENOUS ONCE AS NEEDED
Status: DISCONTINUED | OUTPATIENT
Start: 2024-06-26 | End: 2024-06-26 | Stop reason: HOSPADM

## 2024-06-26 RX ORDER — MIDAZOLAM HYDROCHLORIDE 2 MG/2ML
INJECTION, SOLUTION INTRAMUSCULAR; INTRAVENOUS AS NEEDED
Status: DISCONTINUED | OUTPATIENT
Start: 2024-06-26 | End: 2024-06-26

## 2024-06-26 RX ORDER — KETAMINE HCL IN NACL, ISO-OSM 100MG/10ML
SYRINGE (ML) INJECTION AS NEEDED
Status: DISCONTINUED | OUTPATIENT
Start: 2024-06-26 | End: 2024-06-26

## 2024-06-26 RX ORDER — LIDOCAINE HYDROCHLORIDE 10 MG/ML
INJECTION, SOLUTION EPIDURAL; INFILTRATION; INTRACAUDAL; PERINEURAL AS NEEDED
Status: DISCONTINUED | OUTPATIENT
Start: 2024-06-26 | End: 2024-06-26

## 2024-06-26 RX ORDER — FENTANYL CITRATE 50 UG/ML
INJECTION, SOLUTION INTRAMUSCULAR; INTRAVENOUS AS NEEDED
Status: DISCONTINUED | OUTPATIENT
Start: 2024-06-26 | End: 2024-06-26

## 2024-06-26 RX ORDER — CEFAZOLIN SODIUM 2 G/50ML
SOLUTION INTRAVENOUS AS NEEDED
Status: DISCONTINUED | OUTPATIENT
Start: 2024-06-26 | End: 2024-06-26

## 2024-06-26 RX ORDER — ASPIRIN 81 MG/1
81 TABLET, CHEWABLE ORAL 2 TIMES DAILY
Status: CANCELLED | OUTPATIENT
Start: 2024-06-26

## 2024-06-26 RX ORDER — FENTANYL CITRATE/PF 50 MCG/ML
50 SYRINGE (ML) INJECTION
Status: DISCONTINUED | OUTPATIENT
Start: 2024-06-26 | End: 2024-06-26 | Stop reason: HOSPADM

## 2024-06-26 RX ORDER — ONDANSETRON 2 MG/ML
INJECTION INTRAMUSCULAR; INTRAVENOUS AS NEEDED
Status: DISCONTINUED | OUTPATIENT
Start: 2024-06-26 | End: 2024-06-26

## 2024-06-26 RX ADMIN — HEPARIN SODIUM 5000 UNITS: 5000 INJECTION, SOLUTION INTRAVENOUS; SUBCUTANEOUS at 06:05

## 2024-06-26 RX ADMIN — SUGAMMADEX 150 MG: 100 INJECTION, SOLUTION INTRAVENOUS at 19:35

## 2024-06-26 RX ADMIN — ROCURONIUM BROMIDE 50 MG: 10 INJECTION, SOLUTION INTRAVENOUS at 18:28

## 2024-06-26 RX ADMIN — PROPOFOL 200 MG: 10 INJECTION, EMULSION INTRAVENOUS at 18:27

## 2024-06-26 RX ADMIN — FENTANYL CITRATE 50 MCG: 50 INJECTION, SOLUTION INTRAMUSCULAR; INTRAVENOUS at 18:25

## 2024-06-26 RX ADMIN — Medication 50 MG: at 18:57

## 2024-06-26 RX ADMIN — KETOROLAC TROMETHAMINE 15 MG: 30 INJECTION, SOLUTION INTRAMUSCULAR at 06:05

## 2024-06-26 RX ADMIN — KETOROLAC TROMETHAMINE 15 MG: 30 INJECTION, SOLUTION INTRAMUSCULAR at 17:24

## 2024-06-26 RX ADMIN — CEFAZOLIN SODIUM 2000 MG: 2 SOLUTION INTRAVENOUS at 18:33

## 2024-06-26 RX ADMIN — ACETAMINOPHEN 650 MG: 325 TABLET ORAL at 22:47

## 2024-06-26 RX ADMIN — KETOROLAC TROMETHAMINE 15 MG: 30 INJECTION, SOLUTION INTRAMUSCULAR at 11:52

## 2024-06-26 RX ADMIN — SODIUM CHLORIDE, SODIUM LACTATE, POTASSIUM CHLORIDE, AND CALCIUM CHLORIDE: .6; .31; .03; .02 INJECTION, SOLUTION INTRAVENOUS at 18:21

## 2024-06-26 RX ADMIN — TRANEXAMIC ACID 1000 MG: 10 INJECTION, SOLUTION INTRAVENOUS at 18:33

## 2024-06-26 RX ADMIN — MIDAZOLAM 2 MG: 1 INJECTION INTRAMUSCULAR; INTRAVENOUS at 18:25

## 2024-06-26 RX ADMIN — MORPHINE SULFATE 4 MG: 4 INJECTION INTRAVENOUS at 04:26

## 2024-06-26 RX ADMIN — LIDOCAINE HYDROCHLORIDE 50 MG: 10 INJECTION, SOLUTION EPIDURAL; INFILTRATION; INTRACAUDAL; PERINEURAL at 18:27

## 2024-06-26 RX ADMIN — ONDANSETRON 4 MG: 2 INJECTION INTRAMUSCULAR; INTRAVENOUS at 18:55

## 2024-06-26 NOTE — PROGRESS NOTES
"Progress Note - Orthopedics   Liliana Zheng 63 y.o. female MRN: [id number]  Unit/Bed#: 2 [address] 202      Subjective:    63 y.o.female with left femoral neck fracture, plan for OR today. Pain controlled at rest. She has pain with movement.  No numbness or tingling in the leg    Labs:  0   Lab Value Date/Time    HCT 41.7 06/25/2024 0450    HCT 45.3 06/24/2024 1750    HGB 14.0 06/25/2024 0450    HGB 15.6 (H) 06/24/2024 1750    INR 1.12 06/25/2024 0450    WBC 7.43 06/25/2024 0450    WBC 10.86 (H) 06/24/2024 1750       Meds:    Current Facility-Administered Medications:     acetaminophen (TYLENOL) tablet 650 mg, 650 mg, Oral, Q6H PRN, Wolf Garcia MD    calcium carbonate-vitamin D 500 mg-5 mcg tablet 1 tablet, 1 tablet, Oral, Daily With Breakfast, Wolf Garcia MD    heparin (porcine) subcutaneous injection 5,000 Units, 5,000 Units, Subcutaneous, Q8H CHLOÉ, Wolf Garcia MD, 5,000 Units at 06/26/24 0605    ketorolac (TORADOL) injection 15 mg, 15 mg, Intravenous, Q6H CHLOÉ, Olayide D Olaniyan, CRNP, 15 mg at 06/26/24 0605    morphine injection 2 mg, 2 mg, Intravenous, Q4H PRN, Wolf Garcia MD, 2 mg at 06/25/24 0738    morphine injection 4 mg, 4 mg, Intravenous, Q4H PRN, Wolf Garcia MD, 4 mg at 06/26/24 0426    ondansetron (ZOFRAN) injection 4 mg, 4 mg, Intravenous, Q6H PRN, Wolf Garcia MD    Blood Culture:   No results found for: \"BLOODCX\"    Wound Culture:   No results found for: \"WOUNDCULT\"    Ins and Outs:  I/O last 24 hours:  In: 577.5 [I.V.:577.5]  Out: -           Physical:  Vitals:    06/25/24 2241   BP: 140/81   Pulse: 78   Resp: 18   Temp: 98.2 °F (36.8 °C)   SpO2: 96%     Musculoskeletal: Left lower extremity  Left lower extremity: Leg shortened and externally rotated compared to the contralateral side. Pain with movement of left hip. Sensation intact LLE. 2+ DP pulse. No calf tendernes     Assessment:    63 y.o.female with left femoral neck fracture . "     Plan:  NWMIKEY LOFTONE  MAINTAIN NPO  Plan for OR later today with Dr. Cueva  Pain control  Dispo: Ortho will follow    Sage Adan PA-C

## 2024-06-26 NOTE — PLAN OF CARE
Problem: PAIN - ADULT  Goal: Verbalizes/displays adequate comfort level or baseline comfort level  Description: Interventions:  - Encourage patient to monitor pain and request assistance  - Assess pain using appropriate pain scale  - Administer analgesics based on type and severity of pain and evaluate response  - Implement non-pharmacological measures as appropriate and evaluate response  - Consider cultural and social influences on pain and pain management  - Notify physician/advanced practitioner if interventions unsuccessful or patient reports new pain  Outcome: Progressing     Problem: INFECTION - ADULT  Goal: Absence or prevention of progression during hospitalization  Description: INTERVENTIONS:  - Assess and monitor for signs and symptoms of infection  - Monitor lab/diagnostic results  - Monitor all insertion sites, i.e. indwelling lines, tubes, and drains  - Administer medications as ordered  - Instruct and encourage patient and family to use good hand hygiene technique  - Identify and instruct in appropriate isolation precautions for identified infection/condition  Outcome: Progressing  Goal: Absence of fever/infection during neutropenic period  Description: INTERVENTIONS:  - Monitor WBC    Outcome: Progressing     Problem: SAFETY ADULT  Goal: Patient will remain free of falls  Description: INTERVENTIONS:  - Educate patient/family on patient safety including physical limitations  - Instruct patient to call for assistance with activity   - Consult OT/PT to assist with strengthening/mobility   - Keep Call bell within reach  - Keep bed low and locked with side rails adjusted as appropriate  - Keep care items and personal belongings within reach  - Initiate and maintain comfort rounds  - Make Fall Risk Sign visible to staff  - Offer Toileting every 2 Hours, in advance of need  - Initiate/Maintain bed alarm  - Obtain necessary fall risk management equipment: bed alarm  - Apply yellow socks and bracelet for  high fall risk patients  - Consider moving patient to room near nurses station  Outcome: Progressing  Goal: Maintain or return to baseline ADL function  Description: INTERVENTIONS:  -  Assess patient's ability to carry out ADLs; assess patient's baseline for ADL function and identify physical deficits which impact ability to perform ADLs (bathing, care of mouth/teeth, toileting, grooming, dressing, etc.)  - Assess/evaluate cause of self-care deficits   - Assess range of motion  - Assess patient's mobility; develop plan if impaired  - Assess patient's need for assistive devices and provide as appropriate  - Encourage maximum independence but intervene and supervise when necessary  - Involve family in performance of ADLs  - Assess for home care needs following discharge   - Consider OT consult to assist with ADL evaluation and planning for discharge  - Provide patient education as appropriate  Outcome: Progressing  Goal: Maintains/Returns to pre admission functional level  Description: INTERVENTIONS:  - Perform AM-PAC 6 Click Basic Mobility/ Daily Activity assessment daily.  - Set and communicate daily mobility goal to care team and patient/family/caregiver.   - Collaborate with rehabilitation services on mobility goals if consulted  - Perform Range of Motion 3 times a day.  - Reposition patient every 2 hours.  - Record patient progress and toleration of activity level   Outcome: Progressing     Problem: DISCHARGE PLANNING  Goal: Discharge to home or other facility with appropriate resources  Description: INTERVENTIONS:  - Identify barriers to discharge w/patient and caregiver  - Arrange for needed discharge resources and transportation as appropriate  - Identify discharge learning needs (meds, wound care, etc.)  - Refer to Case Management Department for coordinating discharge planning if the patient needs post-hospital services based on physician/advanced practitioner order or complex needs related to functional  status, cognitive ability, or social support system  Outcome: Progressing     Problem: Knowledge Deficit  Goal: Patient/family/caregiver demonstrates understanding of disease process, treatment plan, medications, and discharge instructions  Description: Complete learning assessment and assess knowledge base.  Interventions:  - Provide teaching at level of understanding  - Provide teaching via preferred learning methods  Outcome: Progressing
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
## 2024-06-26 NOTE — ASSESSMENT & PLAN NOTE
Presented to the ED with left lower extremity pain status post multiple falls  X-ray left tibia-fibula and left femur demonstrated acute subcapital left hip fracture  CT pelvis revealed comminuted and displaced fracture of the left femoral neck  Pain  management  PT/OT evaluation  Consult Orthopedics, plan for L hip arthroplasty 6/26/24

## 2024-06-26 NOTE — OCCUPATIONAL THERAPY NOTE
OT EVALUATION       06/26/24 0842   Note Type   Note type Cancelled Session   Cancel Reasons Patient to operating room   Additional Comments Plan for OT for L hip arthroplasty s/p hip fracture.  Will follow as medically appropriate.   Licensure   [place] License Number  Marisol Bradley MS OTR/L [id number]

## 2024-06-26 NOTE — ASSESSMENT & PLAN NOTE
Fall from standing upright  CT head with no intracranial hemorrhage or calvarial fracture  Fall precautions

## 2024-06-26 NOTE — PROGRESS NOTES
[facility]  Progress Note  Name: Liliana Zheng I  MRN: [id number]  Unit/Bed#: 2 [address] Date of Admission: 6/24/2024   Date of Service: 6/26/2024 I Hospital Day: 2    Assessment & Plan   * Left displaced femoral neck fracture (HCC)  Assessment & Plan  Presented to the ED with left lower extremity pain status post multiple falls  X-ray left tibia-fibula and left femur demonstrated acute subcapital left hip fracture  CT pelvis revealed comminuted and displaced fracture of the left femoral neck  Pain  management  PT/OT evaluation  Consult Orthopedics, plan for L hip arthroplasty 6/26/24    Fall  Assessment & Plan  Fall from standing upright  CT head with no intracranial hemorrhage or calvarial fracture  Fall precautions    Age-related osteoporosis with current pathological fracture with malunion  Assessment & Plan  Fragility fracture, this was a ground-level fall   Vitamin D 33.9  Start vitamin D with calcium             VTE Pharmacologic Prophylaxis: VTE Score: 7 High Risk (Score >/= 5) - Pharmacological DVT Prophylaxis Ordered: heparin. Sequential Compression Devices Ordered.    Mobility:   Basic Mobility Inpatient Raw Score: 15  JH-HLM Goal: 4: Move to chair/commode  JH-HLM Achieved: 2: Bed activities/Dependent transfer  JH-HLM Goal achieved. Continue to encourage appropriate mobility.    Patient Centered Rounds: I performed bedside rounds with nursing staff today.   Discussions with Specialists or Other Care Team Provider: Nursing, Ortho    Education and Discussions with Family / Patient: Attempted to update  () via phone. Left voicemail.     Total Time Spent on Date of Encounter in care of patient: 45 mins. This time was spent on one or more of the following: performing physical exam; counseling and coordination of care; obtaining or reviewing history; documenting in the medical record; reviewing/ordering tests, medications or procedures; communicating with  other healthcare professionals and discussing with patient's family/caregivers.    Current Length of Stay: 2 day(s)  Current Patient Status: Inpatient   Certification Statement: The patient will continue to require additional inpatient hospital stay due to L hip arthroplasty  Discharge Plan: Anticipate discharge in 24-48 hrs to pending PT/OT evaluation    Code Status: Level 1 - Full Code    Subjective:   Patient reports that her pain has been controlled overnight. No acute complaints. Agreeable for surgery today.     Objective:     Vitals:   Temp (24hrs), Av.5 °F (36.9 °C), Min:98.2 °F (36.8 °C), Max:98.7 °F (37.1 °C)    Temp:  [98.2 °F (36.8 °C)-98.7 °F (37.1 °C)] 98.2 °F (36.8 °C)  HR:  [73-78] 78  Resp:  [18] 18  BP: (137-162)/(81-86) 140/81  SpO2:  [96 %-97 %] 96 %  Body mass index is 22.4 kg/m².     Input and Output Summary (last 24 hours):     Intake/Output Summary (Last 24 hours) at 2024 0855  Last data filed at 2024 0227  Gross per 24 hour   Intake 577.5 ml   Output --   Net 577.5 ml       Physical Exam:   Physical Exam  Vitals and nursing note reviewed.   Constitutional:       General: She is not in acute distress.     Appearance: She is well-developed.   HENT:      Head: Normocephalic and atraumatic.   Eyes:      Conjunctiva/sclera: Conjunctivae normal.   Cardiovascular:      Rate and Rhythm: Normal rate and regular rhythm.      Heart sounds: No murmur heard.  Pulmonary:      Effort: Pulmonary effort is normal. No respiratory distress.      Breath sounds: Normal breath sounds.   Abdominal:      Palpations: Abdomen is soft.      Tenderness: There is no abdominal tenderness.   Musculoskeletal:         General: No swelling.      Cervical back: Neck supple.   Skin:     General: Skin is warm and dry.      Capillary Refill: Capillary refill takes less than 2 seconds.   Neurological:      Mental Status: She is alert.   Psychiatric:         Mood and Affect: Mood normal.          Additional Data:      Labs:  Results from last 7 days   Lab Units 06/25/24  0450 06/24/24  1750   WBC Thousand/uL 7.43 10.86*   HEMOGLOBIN g/dL 14.0 15.6*   HEMATOCRIT % 41.7 45.3   PLATELETS Thousands/uL 202 248   SEGS PCT %  --  75   LYMPHO PCT %  --  14   MONO PCT %  --  10   EOS PCT %  --  0     Results from last 7 days   Lab Units 06/25/24  0450   SODIUM mmol/L 143   POTASSIUM mmol/L 3.7   CHLORIDE mmol/L 111*   CO2 mmol/L 27   BUN mg/dL 27*   CREATININE mg/dL 0.91   ANION GAP mmol/L 5   CALCIUM mg/dL 9.0   GLUCOSE RANDOM mg/dL 113     Results from last 7 days   Lab Units 06/25/24  0450   INR  1.12                   Lines/Drains:  Invasive Devices       Peripheral Intravenous Line  Duration             Peripheral IV 06/25/24 Dorsal (posterior);Right Forearm <1 day                          Imaging: No pertinent imaging reviewed.    Recent Cultures (last 7 days):         Last 24 Hours Medication List:   Current Facility-Administered Medications   Medication Dose Route Frequency Provider Last Rate    acetaminophen  650 mg Oral Q6H PRN Wolf Garcia MD      calcium carbonate-vitamin D  1 tablet Oral Daily With Breakfast Wolf Garcia MD      heparin (porcine)  5,000 Units Subcutaneous Q8H [facility] Wolf Garcia MD      ketorolac  15 mg Intravenous Q6H [facility] FLYNN Martin      morphine injection  2 mg Intravenous Q4H PRN Wolf Garcia MD      morphine injection  4 mg Intravenous Q4H PRN Wolf Garcia MD      ondansetron  4 mg Intravenous Q6H PRN Wolf Garcia MD      sodium chloride  75 mL/hr Intravenous Continuous Pandi Todhe, DO 75 mL/hr (06/25/24 2230)        Today, Patient Was Seen By: Melissa Tavarez PA-C    **Please Note: This note may have been constructed using a voice recognition system.**

## 2024-06-26 NOTE — PHYSICAL THERAPY NOTE
Physical Therapy Cancellation Note       06/26/24 0813   Note Type   Note type Cancelled Session   Cancel Reasons Medical status  (Plan for OR today (6/26/24) for L total hip arthroplasty. Will follow-up post-operatively as tolerated/appropriate.)   Licensure   [place] License Number  Marisol Ni [id number]

## 2024-06-26 NOTE — UTILIZATION REVIEW
NOTIFICATION OF INPATIENT ADMISSION   AUTHORIZATION REQUEST   SERVICING FACILITY:   [place] [address]  Tax ID: [id number]  NPI: [id number] ATTENDING PROVIDER:  Attending Name and NPI#: Korinabrien JoedovDo [[id number]]  Address: [address] [place] [address]  Phone: [phone]   ADMISSION INFORMATION:  [facility] of Service: Inpatient [facility]  Place of Service Code: 21  Inpatient Admission Date/Time: 6/24/24  7:12 PM  Discharge Date/Time: No discharge date for patient encounter.  Admitting Diagnosis Code/Description:  Femoral neck fracture (HCC) [S72.009A]  Hip injury [S79.919A]  Fall, initial encounter [W19.XXXA]     UTILIZATION REVIEW CONTACT:  Paty Sofia Utilization   Network Utilization Review Department  Phone: [phone]  Fax [phone]  Email: [email or web address]  Contact for approvals/pending authorizations, clinical reviews, and discharge.     PHYSICIAN ADVISORY SERVICES:  Medical Necessity Denial & Cxmu-fn-Qjsf Review  Phone: [phone]  Fax: [phone]  Email: [email or web address]     DISCHARGE SUPPORT TEAM:  For Patients Discharge Needs & Updates  Phone: [phone] opt. 2 Fax: [phone]  Email: [email or web address]

## 2024-06-27 ENCOUNTER — APPOINTMENT (INPATIENT)
Dept: RADIOLOGY | Facility: HOSPITAL | Age: 64
DRG: 470 | End: 2024-06-27
Payer: COMMERCIAL

## 2024-06-27 ENCOUNTER — ANESTHESIA (INPATIENT)
Dept: PERIOP | Facility: HOSPITAL | Age: 64
DRG: 470 | End: 2024-06-27
Payer: COMMERCIAL

## 2024-06-27 ENCOUNTER — ANESTHESIA EVENT (INPATIENT)
Dept: PERIOP | Facility: HOSPITAL | Age: 64
DRG: 470 | End: 2024-06-27
Payer: COMMERCIAL

## 2024-06-27 LAB
ANION GAP SERPL CALCULATED.3IONS-SCNC: 7 MMOL/L (ref 4–13)
BUN SERPL-MCNC: 18 MG/DL (ref 5–25)
CALCIUM SERPL-MCNC: 8.3 MG/DL (ref 8.4–10.2)
CHLORIDE SERPL-SCNC: 104 MMOL/L (ref 96–108)
CO2 SERPL-SCNC: 27 MMOL/L (ref 21–32)
CREAT SERPL-MCNC: 0.68 MG/DL (ref 0.6–1.3)
ERYTHROCYTE [DISTWIDTH] IN BLOOD BY AUTOMATED COUNT: 11.9 % (ref 11.6–15.1)
GFR SERPL CREATININE-BSD FRML MDRD: 94 ML/MIN/1.73SQ M
GLUCOSE SERPL-MCNC: 117 MG/DL (ref 65–140)
GLUCOSE SERPL-MCNC: 93 MG/DL (ref 65–140)
HCT VFR BLD AUTO: 40.9 % (ref 34.8–46.1)
HGB BLD-MCNC: 13.8 G/DL (ref 11.5–15.4)
MCH RBC QN AUTO: 30.4 PG (ref 26.8–34.3)
MCHC RBC AUTO-ENTMCNC: 33.7 G/DL (ref 31.4–37.4)
MCV RBC AUTO: 90 FL (ref 82–98)
PLATELET # BLD AUTO: 212 THOUSANDS/UL (ref 149–390)
PMV BLD AUTO: 10.9 FL (ref 8.9–12.7)
POTASSIUM SERPL-SCNC: 3.8 MMOL/L (ref 3.5–5.3)
RBC # BLD AUTO: 4.54 MILLION/UL (ref 3.81–5.12)
SODIUM SERPL-SCNC: 138 MMOL/L (ref 135–147)
WBC # BLD AUTO: 6.16 THOUSAND/UL (ref 4.31–10.16)

## 2024-06-27 PROCEDURE — 99024 POSTOP FOLLOW-UP VISIT: CPT | Performed by: PHYSICIAN ASSISTANT

## 2024-06-27 PROCEDURE — C1776 JOINT DEVICE (IMPLANTABLE): HCPCS | Performed by: ORTHOPAEDIC SURGERY

## 2024-06-27 PROCEDURE — 85027 COMPLETE CBC AUTOMATED: CPT | Performed by: PHYSICIAN ASSISTANT

## 2024-06-27 PROCEDURE — 73501 X-RAY EXAM HIP UNI 1 VIEW: CPT

## 2024-06-27 PROCEDURE — NC001 PR NO CHARGE: Performed by: ORTHOPAEDIC SURGERY

## 2024-06-27 PROCEDURE — 27130 TOTAL HIP ARTHROPLASTY: CPT | Performed by: PHYSICIAN ASSISTANT

## 2024-06-27 PROCEDURE — 99232 SBSQ HOSP IP/OBS MODERATE 35: CPT | Performed by: INTERNAL MEDICINE

## 2024-06-27 PROCEDURE — 0SRB0JA REPLACEMENT OF LEFT HIP JOINT WITH SYNTHETIC SUBSTITUTE, UNCEMENTED, OPEN APPROACH: ICD-10-PCS | Performed by: ORTHOPAEDIC SURGERY

## 2024-06-27 PROCEDURE — 0054T BONE SRGRY CMPTR FLUOR IMAGE: CPT | Performed by: ORTHOPAEDIC SURGERY

## 2024-06-27 PROCEDURE — 27130 TOTAL HIP ARTHROPLASTY: CPT | Performed by: ORTHOPAEDIC SURGERY

## 2024-06-27 PROCEDURE — 88305 TISSUE EXAM BY PATHOLOGIST: CPT | Performed by: PATHOLOGY

## 2024-06-27 PROCEDURE — C9290 INJ, BUPIVACAINE LIPOSOME: HCPCS | Performed by: ANESTHESIOLOGY

## 2024-06-27 PROCEDURE — 80048 BASIC METABOLIC PNL TOTAL CA: CPT | Performed by: PHYSICIAN ASSISTANT

## 2024-06-27 PROCEDURE — 82948 REAGENT STRIP/BLOOD GLUCOSE: CPT

## 2024-06-27 PROCEDURE — C1713 ANCHOR/SCREW BN/BN,TIS/BN: HCPCS | Performed by: ORTHOPAEDIC SURGERY

## 2024-06-27 PROCEDURE — 88311 DECALCIFY TISSUE: CPT | Performed by: PATHOLOGY

## 2024-06-27 DEVICE — PINNACLE HIP SOLUTIONS ALTRX POLYETHYLENE ACETABULAR LINER NEUTRAL 36MM ID 52MM OD
Type: IMPLANTABLE DEVICE | Site: HIP | Status: FUNCTIONAL
Brand: PINNACLE ALTRX

## 2024-06-27 DEVICE — ACTIS DUOFIX HIP PROSTHESIS (FEMORAL STEM 12/14 TAPER CEMENTLESS SIZE 6 HIGH COLLAR)  CE
Type: IMPLANTABLE DEVICE | Site: HIP | Status: FUNCTIONAL
Brand: ACTIS

## 2024-06-27 DEVICE — 1.7MM COCR CABLE WITH TI CRIMP 750MM-STERILE: Type: IMPLANTABLE DEVICE | Site: HIP | Status: FUNCTIONAL

## 2024-06-27 DEVICE — BIOLOX DELTA CERAMIC FEMORAL HEAD +1.5 36MM DIA 12/14 TAPER
Type: IMPLANTABLE DEVICE | Site: HIP | Status: FUNCTIONAL
Brand: BIOLOX DELTA

## 2024-06-27 DEVICE — APEX HOLE ELIMINATOR - PS
Type: IMPLANTABLE DEVICE | Site: HIP | Status: FUNCTIONAL
Brand: APEX

## 2024-06-27 DEVICE — PINNACLE POROCOAT ACETABULAR SHELL SECTOR II 52MM OD
Type: IMPLANTABLE DEVICE | Site: HIP | Status: FUNCTIONAL
Brand: PINNACLE POROCOAT

## 2024-06-27 RX ORDER — HYDROMORPHONE HCL/PF 1 MG/ML
0.5 SYRINGE (ML) INJECTION EVERY 2 HOUR PRN
Status: DISCONTINUED | OUTPATIENT
Start: 2024-06-27 | End: 2024-06-28 | Stop reason: HOSPADM

## 2024-06-27 RX ORDER — ACETAMINOPHEN 325 MG/1
975 TABLET ORAL EVERY 8 HOURS
Status: DISCONTINUED | OUTPATIENT
Start: 2024-06-27 | End: 2024-06-28 | Stop reason: HOSPADM

## 2024-06-27 RX ORDER — DOCUSATE SODIUM 100 MG/1
100 CAPSULE, LIQUID FILLED ORAL 2 TIMES DAILY
Status: DISCONTINUED | OUTPATIENT
Start: 2024-06-27 | End: 2024-06-28 | Stop reason: HOSPADM

## 2024-06-27 RX ORDER — DEXAMETHASONE SODIUM PHOSPHATE 10 MG/ML
INJECTION, SOLUTION INTRAMUSCULAR; INTRAVENOUS AS NEEDED
Status: DISCONTINUED | OUTPATIENT
Start: 2024-06-27 | End: 2024-06-27

## 2024-06-27 RX ORDER — FENTANYL CITRATE 50 UG/ML
INJECTION, SOLUTION INTRAMUSCULAR; INTRAVENOUS AS NEEDED
Status: DISCONTINUED | OUTPATIENT
Start: 2024-06-27 | End: 2024-06-27

## 2024-06-27 RX ORDER — BISACODYL 10 MG
10 SUPPOSITORY, RECTAL RECTAL DAILY PRN
Status: DISCONTINUED | OUTPATIENT
Start: 2024-06-27 | End: 2024-06-28 | Stop reason: HOSPADM

## 2024-06-27 RX ORDER — ROCURONIUM BROMIDE 10 MG/ML
INJECTION, SOLUTION INTRAVENOUS AS NEEDED
Status: DISCONTINUED | OUTPATIENT
Start: 2024-06-27 | End: 2024-06-27

## 2024-06-27 RX ORDER — ONDANSETRON 2 MG/ML
4 INJECTION INTRAMUSCULAR; INTRAVENOUS ONCE AS NEEDED
Status: DISCONTINUED | OUTPATIENT
Start: 2024-06-27 | End: 2024-06-27 | Stop reason: HOSPADM

## 2024-06-27 RX ORDER — SODIUM CHLORIDE, SODIUM LACTATE, POTASSIUM CHLORIDE, CALCIUM CHLORIDE 600; 310; 30; 20 MG/100ML; MG/100ML; MG/100ML; MG/100ML
125 INJECTION, SOLUTION INTRAVENOUS CONTINUOUS
Status: CANCELLED | OUTPATIENT
Start: 2024-06-27

## 2024-06-27 RX ORDER — MAGNESIUM HYDROXIDE/ALUMINUM HYDROXICE/SIMETHICONE 120; 1200; 1200 MG/30ML; MG/30ML; MG/30ML
30 SUSPENSION ORAL EVERY 6 HOURS PRN
Status: DISCONTINUED | OUTPATIENT
Start: 2024-06-27 | End: 2024-06-28 | Stop reason: HOSPADM

## 2024-06-27 RX ORDER — BUPIVACAINE HYDROCHLORIDE 2.5 MG/ML
INJECTION, SOLUTION EPIDURAL; INFILTRATION; INTRACAUDAL
Status: DISCONTINUED | OUTPATIENT
Start: 2024-06-27 | End: 2024-06-27

## 2024-06-27 RX ORDER — CEFAZOLIN SODIUM 2 G/50ML
2000 SOLUTION INTRAVENOUS ONCE
Status: COMPLETED | OUTPATIENT
Start: 2024-06-27 | End: 2024-06-27

## 2024-06-27 RX ORDER — PROPOFOL 10 MG/ML
INJECTION, EMULSION INTRAVENOUS AS NEEDED
Status: DISCONTINUED | OUTPATIENT
Start: 2024-06-27 | End: 2024-06-27

## 2024-06-27 RX ORDER — ONDANSETRON 2 MG/ML
INJECTION INTRAMUSCULAR; INTRAVENOUS AS NEEDED
Status: DISCONTINUED | OUTPATIENT
Start: 2024-06-27 | End: 2024-06-27

## 2024-06-27 RX ORDER — CEFAZOLIN SODIUM 2 G/50ML
2000 SOLUTION INTRAVENOUS EVERY 8 HOURS
Status: COMPLETED | OUTPATIENT
Start: 2024-06-27 | End: 2024-06-28

## 2024-06-27 RX ORDER — ONDANSETRON 2 MG/ML
4 INJECTION INTRAMUSCULAR; INTRAVENOUS EVERY 6 HOURS PRN
Status: DISCONTINUED | OUTPATIENT
Start: 2024-06-27 | End: 2024-06-27 | Stop reason: SDUPTHER

## 2024-06-27 RX ORDER — PANTOPRAZOLE SODIUM 40 MG/1
40 TABLET, DELAYED RELEASE ORAL
Status: DISCONTINUED | OUTPATIENT
Start: 2024-06-28 | End: 2024-06-28 | Stop reason: HOSPADM

## 2024-06-27 RX ORDER — HYDROMORPHONE HCL/PF 1 MG/ML
0.5 SYRINGE (ML) INJECTION
Status: DISCONTINUED | OUTPATIENT
Start: 2024-06-27 | End: 2024-06-27 | Stop reason: HOSPADM

## 2024-06-27 RX ORDER — GABAPENTIN 100 MG/1
200 CAPSULE ORAL 2 TIMES DAILY
Status: DISCONTINUED | OUTPATIENT
Start: 2024-06-27 | End: 2024-06-28 | Stop reason: HOSPADM

## 2024-06-27 RX ORDER — SODIUM CHLORIDE 9 MG/ML
75 INJECTION, SOLUTION INTRAVENOUS CONTINUOUS
Status: DISCONTINUED | OUTPATIENT
Start: 2024-06-27 | End: 2024-06-28

## 2024-06-27 RX ORDER — MIDAZOLAM HYDROCHLORIDE 2 MG/2ML
INJECTION, SOLUTION INTRAMUSCULAR; INTRAVENOUS AS NEEDED
Status: DISCONTINUED | OUTPATIENT
Start: 2024-06-27 | End: 2024-06-27

## 2024-06-27 RX ORDER — MAGNESIUM HYDROXIDE 1200 MG/15ML
LIQUID ORAL AS NEEDED
Status: DISCONTINUED | OUTPATIENT
Start: 2024-06-27 | End: 2024-06-27 | Stop reason: HOSPADM

## 2024-06-27 RX ORDER — OXYCODONE HYDROCHLORIDE 5 MG/1
5 TABLET ORAL EVERY 4 HOURS PRN
Status: DISCONTINUED | OUTPATIENT
Start: 2024-06-27 | End: 2024-06-28 | Stop reason: HOSPADM

## 2024-06-27 RX ORDER — OXYCODONE HYDROCHLORIDE 10 MG/1
10 TABLET ORAL EVERY 4 HOURS PRN
Status: DISCONTINUED | OUTPATIENT
Start: 2024-06-27 | End: 2024-06-28 | Stop reason: HOSPADM

## 2024-06-27 RX ORDER — LIDOCAINE HYDROCHLORIDE 10 MG/ML
INJECTION, SOLUTION EPIDURAL; INFILTRATION; INTRACAUDAL; PERINEURAL AS NEEDED
Status: DISCONTINUED | OUTPATIENT
Start: 2024-06-27 | End: 2024-06-27

## 2024-06-27 RX ORDER — ASPIRIN 325 MG
325 TABLET ORAL 2 TIMES DAILY
Status: DISCONTINUED | OUTPATIENT
Start: 2024-06-27 | End: 2024-06-28 | Stop reason: HOSPADM

## 2024-06-27 RX ORDER — SODIUM CHLORIDE, SODIUM LACTATE, POTASSIUM CHLORIDE, CALCIUM CHLORIDE 600; 310; 30; 20 MG/100ML; MG/100ML; MG/100ML; MG/100ML
INJECTION, SOLUTION INTRAVENOUS CONTINUOUS PRN
Status: DISCONTINUED | OUTPATIENT
Start: 2024-06-27 | End: 2024-06-27

## 2024-06-27 RX ORDER — KETAMINE HCL IN NACL, ISO-OSM 100MG/10ML
SYRINGE (ML) INJECTION AS NEEDED
Status: DISCONTINUED | OUTPATIENT
Start: 2024-06-27 | End: 2024-06-27

## 2024-06-27 RX ORDER — FENTANYL CITRATE/PF 50 MCG/ML
50 SYRINGE (ML) INJECTION
Status: DISCONTINUED | OUTPATIENT
Start: 2024-06-27 | End: 2024-06-27 | Stop reason: HOSPADM

## 2024-06-27 RX ORDER — PROPOFOL 10 MG/ML
INJECTION, EMULSION INTRAVENOUS CONTINUOUS PRN
Status: DISCONTINUED | OUTPATIENT
Start: 2024-06-27 | End: 2024-06-27

## 2024-06-27 RX ORDER — TRANEXAMIC ACID 10 MG/ML
1000 INJECTION, SOLUTION INTRAVENOUS ONCE
Status: COMPLETED | OUTPATIENT
Start: 2024-06-27 | End: 2024-06-27

## 2024-06-27 RX ADMIN — FENTANYL CITRATE 50 MCG: 50 INJECTION, SOLUTION INTRAMUSCULAR; INTRAVENOUS at 15:47

## 2024-06-27 RX ADMIN — GABAPENTIN 200 MG: 100 CAPSULE ORAL at 17:27

## 2024-06-27 RX ADMIN — FENTANYL CITRATE 50 MCG: 50 INJECTION, SOLUTION INTRAMUSCULAR; INTRAVENOUS at 12:46

## 2024-06-27 RX ADMIN — KETOROLAC TROMETHAMINE 15 MG: 30 INJECTION, SOLUTION INTRAMUSCULAR at 00:02

## 2024-06-27 RX ADMIN — BUPIVACAINE HYDROCHLORIDE 10 ML: 2.5 INJECTION, SOLUTION EPIDURAL; INFILTRATION; INTRACAUDAL at 15:30

## 2024-06-27 RX ADMIN — SODIUM CHLORIDE 75 ML/HR: 0.9 INJECTION, SOLUTION INTRAVENOUS at 16:35

## 2024-06-27 RX ADMIN — Medication 20 MG: at 13:23

## 2024-06-27 RX ADMIN — PROPOFOL 50 MCG/KG/MIN: 10 INJECTION, EMULSION INTRAVENOUS at 13:23

## 2024-06-27 RX ADMIN — FENTANYL CITRATE 50 MCG: 50 INJECTION, SOLUTION INTRAMUSCULAR; INTRAVENOUS at 13:56

## 2024-06-27 RX ADMIN — BUPIVACAINE 10 ML: 13.3 INJECTION, SUSPENSION, LIPOSOMAL INFILTRATION at 15:30

## 2024-06-27 RX ADMIN — Medication 1 TABLET: at 07:59

## 2024-06-27 RX ADMIN — LIDOCAINE HYDROCHLORIDE 50 MG: 10 INJECTION, SOLUTION EPIDURAL; INFILTRATION; INTRACAUDAL; PERINEURAL at 12:46

## 2024-06-27 RX ADMIN — FENTANYL CITRATE 50 MCG: 50 INJECTION, SOLUTION INTRAMUSCULAR; INTRAVENOUS at 15:34

## 2024-06-27 RX ADMIN — TRANEXAMIC ACID 1000 MG: 10 INJECTION, SOLUTION INTRAVENOUS at 13:05

## 2024-06-27 RX ADMIN — SUGAMMADEX 200 MG: 100 INJECTION, SOLUTION INTRAVENOUS at 15:00

## 2024-06-27 RX ADMIN — CEFAZOLIN SODIUM 2000 MG: 2 SOLUTION INTRAVENOUS at 12:37

## 2024-06-27 RX ADMIN — OXYCODONE HYDROCHLORIDE 10 MG: 10 TABLET ORAL at 19:08

## 2024-06-27 RX ADMIN — SODIUM CHLORIDE, SODIUM LACTATE, POTASSIUM CHLORIDE, AND CALCIUM CHLORIDE: .6; .31; .03; .02 INJECTION, SOLUTION INTRAVENOUS at 14:43

## 2024-06-27 RX ADMIN — DOCUSATE SODIUM 100 MG: 100 CAPSULE, LIQUID FILLED ORAL at 17:27

## 2024-06-27 RX ADMIN — Medication 30 MG: at 12:50

## 2024-06-27 RX ADMIN — PROPOFOL 200 MG: 10 INJECTION, EMULSION INTRAVENOUS at 12:47

## 2024-06-27 RX ADMIN — MIDAZOLAM 2 MG: 1 INJECTION INTRAMUSCULAR; INTRAVENOUS at 12:37

## 2024-06-27 RX ADMIN — MORPHINE SULFATE 2 MG: 2 INJECTION, SOLUTION INTRAMUSCULAR; INTRAVENOUS at 01:58

## 2024-06-27 RX ADMIN — ROCURONIUM BROMIDE 50 MG: 10 INJECTION, SOLUTION INTRAVENOUS at 12:47

## 2024-06-27 RX ADMIN — ASPIRIN 325 MG: 325 TABLET ORAL at 20:04

## 2024-06-27 RX ADMIN — CEFAZOLIN SODIUM 2000 MG: 2 SOLUTION INTRAVENOUS at 20:04

## 2024-06-27 RX ADMIN — ONDANSETRON 4 MG: 2 INJECTION INTRAMUSCULAR; INTRAVENOUS at 14:40

## 2024-06-27 RX ADMIN — FENTANYL CITRATE 50 MCG: 50 INJECTION, SOLUTION INTRAMUSCULAR; INTRAVENOUS at 13:19

## 2024-06-27 RX ADMIN — FENTANYL CITRATE 50 MCG: 50 INJECTION, SOLUTION INTRAMUSCULAR; INTRAVENOUS at 13:38

## 2024-06-27 RX ADMIN — ACETAMINOPHEN 975 MG: 325 TABLET ORAL at 16:35

## 2024-06-27 RX ADMIN — KETOROLAC TROMETHAMINE 15 MG: 30 INJECTION, SOLUTION INTRAMUSCULAR at 05:04

## 2024-06-27 RX ADMIN — DEXAMETHASONE SODIUM PHOSPHATE 10 MG: 10 INJECTION, SOLUTION INTRAMUSCULAR; INTRAVENOUS at 13:00

## 2024-06-27 RX ADMIN — SODIUM CHLORIDE, SODIUM LACTATE, POTASSIUM CHLORIDE, AND CALCIUM CHLORIDE: .6; .31; .03; .02 INJECTION, SOLUTION INTRAVENOUS at 12:37

## 2024-06-27 RX ADMIN — KETOROLAC TROMETHAMINE 15 MG: 30 INJECTION, SOLUTION INTRAMUSCULAR at 11:44

## 2024-06-27 NOTE — ANESTHESIA PROCEDURE NOTES
Peripheral Block    Patient location during procedure: holding area  Start time: 6/27/2024 3:30 PM  Reason for block: at surgeon's request and post-op pain management  Staffing  Performed by: Mayo Bello MD  Authorized by: Mayo Bello MD    Preanesthetic Checklist  Completed: patient identified, IV checked, site marked, risks and benefits discussed, surgical consent, monitors and equipment checked, pre-op evaluation and timeout performed  Peripheral Block  Patient position: supine  Prep: ChloraPrep  Patient monitoring: frequent blood pressure checks, continuous pulse oximetry and heart rate  Block type: PENG  Laterality: left  Injection technique: single-shot  Procedures: ultrasound guided, Ultrasound guidance required for the procedure to increase accuracy and safety of medication placement and decrease risk of complications.  Ultrasound permanent image saved  bupivacaine (PF) (MARCAINE) 0.25 % injection 20 mL - Perineural   10 mL - 6/27/2024 3:30:00 PM  bupivacaine liposomal (EXPAREL) 1.3 % injection 20 mL - Perineural   10 mL - 6/27/2024 3:30:00 PM  Needle  Needle type: Stimuplex   Needle gauge: 20 G  Needle length: 4 in  Needle localization: anatomical landmarks and ultrasound guidance  Needle insertion depth: 6 cm  Assessment  Injection assessment: incremental injection, frequent aspiration, injected with ease, negative aspiration, negative for heart rate change, no paresthesia on injection, no symptoms of intraneural/intravenous injection and needle tip visualized at all times  Paresthesia pain: none  Post-procedure:  site cleaned  patient tolerated the procedure well with no immediate complications

## 2024-06-27 NOTE — PROGRESS NOTES
"Progress Note - Orthopedics   Liliana Zheng 63 y.o. female MRN: [id number]  Unit/Bed#: [facility]      Subjective:    63 y.o.female with displaced left femoral neck fracture, plan for return to OR today. Pain controlled at rest. She has pain with movement.  No numbness or tingling in the leg.  Events of last evening again reviewed.      Labs:  0   Lab Value Date/Time    HCT 40.9 06/27/2024 0504    HCT 41.7 06/25/2024 0450    HCT 45.3 06/24/2024 1750    HGB 13.8 06/27/2024 0504    HGB 14.0 06/25/2024 0450    HGB 15.6 (H) 06/24/2024 1750    INR 1.12 06/25/2024 0450    WBC 6.16 06/27/2024 0504    WBC 7.43 06/25/2024 0450    WBC 10.86 (H) 06/24/2024 1750       Meds:    Current Facility-Administered Medications:     [Transfer Hold] acetaminophen (TYLENOL) tablet 650 mg, 650 mg, Oral, Q6H PRN, Erik Holm PA-C, 650 mg at 06/26/24 2247    [Transfer Hold] calcium carbonate-vitamin D 500 mg-5 mcg tablet 1 tablet, 1 tablet, Oral, Daily With Breakfast, Erik Holm PA-C, 1 tablet at 06/27/24 0759    [Transfer Hold] ketorolac (TORADOL) injection 15 mg, 15 mg, Intravenous, Q6H CHLOÉ, Erik Holm PA-C, 15 mg at 06/27/24 1144    [Transfer Hold] morphine injection 2 mg, 2 mg, Intravenous, Q4H PRN, Erik Holm PA-C, 2 mg at 06/27/24 0158    [Transfer Hold] morphine injection 4 mg, 4 mg, Intravenous, Q4H PRN, Erik Holm PA-C, 4 mg at 06/26/24 0426    [Transfer Hold] ondansetron (ZOFRAN) injection 4 mg, 4 mg, Intravenous, Q6H PRN, Erik Holm PA-C    Blood Culture:   No results found for: \"BLOODCX\"    Wound Culture:   No results found for: \"WOUNDCULT\"    Ins and Outs:  I/O last 24 hours:  In: 750 [I.V.:750]  Out: 1050 [Urine:1050]          Physical:  Vitals:    06/27/24 0758   BP: 145/80   Pulse: 77   Resp: 18   Temp: 98.8 °F (37.1 °C)   SpO2: 96%     Musculoskeletal: Left lower extremity  Left lower extremity: Leg shortened and externally rotated compared to the contralateral side. Pain with movement of left " hip. Sensation intact LLE. 2+ DP pulse. No calf tendernes     Assessment:    63 y.o.female with left femoral neck fracture .     Plan:  NWB LLE  MAINTAIN NPO  Plan for OR later today for DA L CATHIE for fracture  Pain control  Dispo: Ortho will follow    Twan Cueva DO

## 2024-06-27 NOTE — ASSESSMENT & PLAN NOTE
Presented to the ED with left lower extremity pain status post multiple falls  X-ray left tibia-fibula and left femur demonstrated acute subcapital left hip fracture  CT pelvis revealed comminuted and displaced fracture of the left femoral neck  Pain  management  PT/OT evaluation  Consult Orthopedics, plan for L hip arthroplasty 6/27/24

## 2024-06-27 NOTE — OP NOTE
OPERATIVE REPORT  PATIENT NAME: Liliana Zheng  : 1960  MRN: [id number]  Pt Location:  [facility] OR ROOM 03    Surgery Date: 2024    Surgeons and Role:     * Twan Cueva DO - Primary     * Luis Eduardo Martinez PA-C - Assisting, no qualified resident was available an assistant was needed for patient positioning, prepping and draping, soft tissue retraction, protection of vital structures throughout the case, exposure of the femur and acetabulum for preparation and implantation of final prosthesis, superficial closure, and to complete the case safely.    * Edwin Durbin,  ATC/OTC - 2nd Assist    Preop Diagnosis:  Left displaced femoral neck fracture (HCC) [S72.002A]  Left hip pain    Post-Op Diagnosis Codes:     * Left displaced femoral neck fracture (HCC) [S72.002A]     * Left hip pain    Procedure(s):  Left - ARTHROPLASTY HIP TOTAL ANTERIOR    Specimens: femoral head remnant  ID Type Source Tests Collected by Time Destination   1 : femoral head Tissue Joint, Left Hip TISSUE EXAM Twan Cueva DO 2024 1339      Estimated Blood Loss:   380 mL    Drains: None  Urethral Catheter Latex 16 Fr. (Active)   Reasons to continue Urinary Catheter  Post-operative urological requirements 24 0820   Site Assessment Skin intact;Clean 24 0820   [place] Care Done 24 0815   Securement Method Securing device (Describe) 24 1903   Output (mL) 1050 mL 24 0810   Number of days: 1     Anesthesia Type:   General, postop Peng block with Exparel    Antibiotics: Ancef 2 g    Intravenous fluids: 900 cc    Urine output: [place]: 200 cc    Implant Name Type Inv. Item Serial No.  Lot No. LRB No. Used Action   CABLE SYNTHES COCR 1.7MM 750MM [id number] - [id number]  CABLE SYNTHES COCR 1.7MM 750MM [id number]  Synthes [id number] Left 1 Implanted   SHELL ACETABULAR 52MM POROUS SOLID LCK RING PINNACLE - [id number]  SHELL ACETABULAR 52MM POROUS SOLID LCK RING PINNACLE  [id number] Left 1  Implanted   ELIMINATOR ACTB THRD POS STOP - [id number]  ELIMINATOR ACTB THRD POS STOP  DEPUY [id number] Left 1 Implanted   LINER ACTB ULT LNK PE 36 X 52MM 0DEG NEUTRAL ALTRX - [id number]  LINER ACTB ULT LNK PE 36 X 52MM 0DEG NEUTRAL ALTRX  DEPUY [id number] Left 1 Implanted   STEM FEM SZ 6 TAPER CMNTLS HI COLLAR ACTIS - [id number]  STEM FEM SZ 6 TAPER CMNTLS HI COLLAR ACTIS  DEPUY [id number] Left 1 Implanted   HEAD FEMORAL 12/14 TPR 36MM +1.5MM ARTICULEZE BIOLOX DELTA - [id number]  HEAD FEMORAL 12/14 TPR 36MM +1.5MM ARTICULEZE BIOLOX DELTA  DEPUY [id number] Left 1 Implanted     Operative Indications:  Left displaced femoral neck fracture (HCC) [S72.002A]  Left hip pain  The patient is a 63-year-old female that presented to the emergency department after multiple falls over the last 3 weeks.  She was found to have a displaced left femoral neck fracture.  Based off of her history, pain, and inability to ambulate the hip fracture was approximately 3 weeks old on presentation.  The patient was ultimately admitted to the hospital for medical optimization in preparation for definitive care.  Orthopedics was consulted and recommended that she undergo left total hip arthroplasty to address her displaced femoral neck fracture due to her relatively young age and activity level.  She was agreeable to this.  Patient was optimized.  Consents were obtained and placed in the chart.  Ultimately the patient underwent direct anterior left total of arthroplasty for fracture on 6/27/2024.    Operative Findings:  Intraoperatively there was evidence of a displaced comminuted subcapital left femoral neck fracture.  There was no sign of acute hematoma but rather fracture seroma consistent with her history and confirming that this fracture is subacute.  The hip navigation software was utilized for the case.  A press-fit acetabular component was implanted in to the acetabulum and approximately 47 degrees of inclination and 20 degrees of anteversion.   This had excellent press-fit.  The polyethylene liner was locked into place.  On initial evaluation of the fracture pattern there was evidence of comminution that did extend down towards the anterior medial calcar above the level of the lesser troches therefore prophylactic cable was placed around the proximal femur above the level of the lesser troches to avoid any further fracture propagation.  The femoral neck osteotomy was made at the very distal aspect of this fracture extension.  A press-fit femoral component was implanted approximately 10 degrees of anteversion down to its final position and the proximal femur where it had excellent rotational and axial stability.  No additional fracture occurred during preparation or implantation.  The final construct had excellent stability to 50 degrees of internal rotation, 110 degrees of external rotation, and 50 degrees of external rotation with the leg lowered to the floor.  On leg length evaluation the patient's leg length and offset were both optimized with the final implant.  The patient tolerated the procedure.  There were no complications.    Complications:   None    Hip Approach: Direct anterior    Procedure and Technique:  The patient was identified and marked in the preoperative holding area, and the correct operative extremity and intended procedure was confirmed. The consents were visualized and confirmed for correct procedure and laterality. The patient was then taken back to the operating room where there were administered general anesthesia by the anesthesia staff. The patient was administered 2 g of Ancef intravenously by the anesthesia staff. The patient was then transferred to the HANA table for the procedure. After the patient was appropriately positioned, a AP of the pelvis was obtained using fluoroscopy to evaluate the left hip injury pattern and this demonstrated a comminuted displaced subcapital femoral neck fracture. The left lower extremity was  prepped and draped in a standard sterile fashion. After a timeout was performed and all members of the operating room team were in agreement of the correct patient, and correct intended procedure the bony landmarks were identified using marking pen. Tranexamic acid was administered by anesthesia.  A modified Chambers-Milton incision was delineated obliquely starting 2 cm distal and 2 cm laterally to the ASIS.  Sharp dissection was carried down through the subcutaneous tissues to the investing fascia of the tensor fascia rebel muscle. Electrocautery was used to maintain hemostasis in the subcutaneous tissues. The investing fascia of the tensor fascia rebel was incised in line with the fibers in this compartment was entered bluntly and the muscle was retracted laterally. The perforating circumflex femoral vessels were carefully identified and cauterized using electrocautery and the Aquamantis. Dissection was then carried down to the left hip capsule, and the pre-capsular fat was excised. A capsulotomy was carried out across the edge of the acetabulum superiorly down the femoral neck and into the intra-articular trochanteric line. Both sides of the capsulotomy were tagged with a Ethibond suture.  During the capsulotomy there was no evidence of acute hematoma but rather fracture seroma and coagulated hematoma consistent with her subacute presentation of her injury.  On inspection there was evidence of fracture comminution from the femoral neck fracture that did extend down the anterior medial aspect of the proximal femur into the medial calcar area.  With this finding a prophylactic cerclage cable was then placed around the proximal femur above the lesser trochanter and secured down to place to prevent any fracture propagation during preparation for implantation of final prosthesis.  The femoral neck osteotomy was then performed at the very distal aspect of this comminution on the femoral neck with an oscillating saw and  all bony fragments were removed.  The fractured femoral head and all of its associated comminution were removed from the acetabulum without difficulty.  The head was sent to pathology for specimen.  The femoral head was sized.  There were mild degenerative changes diffusely in the acetabulum.  The acetabulum was cleaned of debris and pulvinar, the EITAN was well-visualized, the remaining labrum was removed, and reaming began. The acetabulum was reamed starting with a size 47 reamer and carried up in 1-2 mm increments until a size 51 was reached.  The hip navigation software was utilized for the case.  A size 51 acetabular trial was impacted into the acetabulum and demonstrated an appropriate fit. The appropriate abduction and anteversion of approximately 45° and 20° respectively was confirmed using fluoroscopy. The trial implant was removed, the host bone was touched with a size 52 reamer, and a size 52 [place] Sector 2 cluster hole final acetabular component was impacted into place under direct visualization with fluoroscopy. This demonstrated excellent scratch fit.  This final position was in 47 degrees of inclination and 20 degrees of anteversion.  On inspection the anterior aspect of the component was tucked just inside the anterior rim of the acetabulum.  The acetabulum.  Well-seated in the host bone.  The final 36 mm/52 mm Altrx highly cross-linked polyethylene insert was impacted into the acetabular component.  The liner was locked in its position on visual and tactile inspection.    Attention was then turned back to the proximal femur.  The appropriate proximal femoral releases were utilized as needed to mobilize the femur, ensuring not to release the obturator externus.  The soft tissue was removed from the region between the femoral neck and the greater trochanter a box osteotome was used to cut into the lateral aspect of the proximal femur.  The small starter broach was then inserted into the proximal  femur adding proximally 10° of anteversion while preparing the proximal femur. Broaching was carried up in sequence until a size 6 broach demonstrated excellent fit and rotational stability. Trialing was performed.  A trial construct with a standard neck and a +1.5 36 mm femoral head showed excellent stability on 110 degrees of external rotation, 50 degrees of internal rotation, and 50 degrees of external rotation with extension of the hip to the floor. Leg lengths were evaluated on fluoroscopy images and the transparency overlay method.  With this trial construct in place the length appeared to be equalized to the contralateral side however more offset was needed to match the contralateral side.  It was decided that the size 6 high offset stem would be the best final implant to match the contralateral side.  A calcar planer was used on the proximal femur without complication.  The trial components were removed from the proximal femur and the final Depuy Actis size 6 high offset femoral component was inserted into the femur by hand and impacted into place. The trunnion was cleaned and dried and the final Biolox delta ceramic +1.5 36 mm head was impacted upon the final femoral stem. Hip was reduced and taken through stability testing. The patient demonstrated excellent stability with 110 degrees of external rotation, 50 degrees of internal rotation, and 50 degrees of external rotation and extension of the hip to the floor.  There was no lateral subluxation of the hip on manual testing.  Leg lengths were again evaluated using fluoroscopy and the leg lengths were unchanged from trialing and the patient's offset was appropriately matched.  The wound was copiously irrigated with Irrisept followed by normal saline solution, the capsule was closed using a #2 Ethibond suture. The wound was again irrigated.  The investing fascia of the tensor fascia rebel muscle was closed using a bidirectional barbed #1 barbed suture.  The  deep subcutaneous tissues were reapproximated using an undyed 0 Vicryl, the superficial subcutaneous tissues were reapproximated using an undyed 2-0 Vicryl, the skin edges were reapproximated using a 3-0 bidirectional barbed Monocryl suture, and the skin edges sealed with Exofin.  After the skin adhesive had dried a silver impregnated Mepilex dressing was applied over the surgical incision.  The patient was awakened from general anesthesia with sedation and transferred to PACU in stable condition.     I was present for all critical portions of the procedure.    Patient Disposition:  PACU       SIGNATURE: Twan Cueva,   DATE: June 27, 2024  TIME: 2:48 PM

## 2024-06-27 NOTE — ANESTHESIA POSTPROCEDURE EVALUATION
Post-Op Assessment Note    CV Status:  Stable  Pain Score: 0    Pain management: adequate       Mental Status:  Sleepy   Hydration Status:  Stable   PONV Controlled:  None   Airway Patency:  Patent     Post Op Vitals Reviewed: Yes    No anethesia notable event occurred.    Staff: Anesthesiologist, CRNA               BP   150/70   Temp      Pulse  70   Resp   14   SpO2   99

## 2024-06-27 NOTE — PROGRESS NOTES
[facility]  Progress Note  Name: Liliana Zheng I  MRN: [id number]  Unit/Bed#: 2 [address] Date of Admission: 6/24/2024   Date of Service: 6/27/2024 I Hospital Day: 3    Assessment & Plan   * Left displaced femoral neck fracture (HCC)  Assessment & Plan  Presented to the ED with left lower extremity pain status post multiple falls  X-ray left tibia-fibula and left femur demonstrated acute subcapital left hip fracture  CT pelvis revealed comminuted and displaced fracture of the left femoral neck  Pain  management  PT/OT evaluation  Consult Orthopedics, plan for L hip arthroplasty 6/27/24    Fall  Assessment & Plan  Fall from standing upright  CT head with no intracranial hemorrhage or calvarial fracture  Fall precautions    Age-related osteoporosis with current pathological fracture with malunion  Assessment & Plan  Fragility fracture, this was a ground-level fall   Vitamin D 33.9  Start vitamin D with calcium             VTE Pharmacologic Prophylaxis: VTE Score: 7  Heparin held prior to surgery    Mobility:   Basic Mobility Inpatient Raw Score: 15  JH-HLM Goal: 4: Move to chair/commode  JH-HLM Achieved: 2: Bed activities/Dependent transfer  JH-HLM Goal achieved. Continue to encourage appropriate mobility.    Patient Centered Rounds: I performed bedside rounds with nursing staff today.   Discussions with Specialists or Other Care Team Provider: Nursing, Ortho    Education and Discussions with Family / Patient: Updated  () via phone.    Total Time Spent on Date of Encounter in care of patient: 45 mins. This time was spent on one or more of the following: performing physical exam; counseling and coordination of care; obtaining or reviewing history; documenting in the medical record; reviewing/ordering tests, medications or procedures; communicating with other healthcare professionals and discussing with patient's family/caregivers.    Current Length of Stay: 3  day(s)  Current Patient Status: Inpatient   Certification Statement: The patient will continue to require additional inpatient hospital stay due to L hip arthroplasty  Discharge Plan: Anticipate discharge in 24-48 hrs to pending PT/OT evaluation    Code Status: Level 1 - Full Code    Subjective:   Patient reports that the power went out last night delaying her surgery until today. Denies any pain or acute complaints this morning.     Objective:     Vitals:   Temp (24hrs), Av.2 °F (36.8 °C), Min:97.3 °F (36.3 °C), Max:98.8 °F (37.1 °C)    Temp:  [97.3 °F (36.3 °C)-98.8 °F (37.1 °C)] 98.8 °F (37.1 °C)  HR:  [68-77] 77  Resp:  [16-18] 18  BP: (145-168)/(77-96) 145/80  SpO2:  [94 %-100 %] 96 %  Body mass index is 22.4 kg/m².     Input and Output Summary (last 24 hours):     Intake/Output Summary (Last 24 hours) at 2024 0828  Last data filed at 2024 0810  Gross per 24 hour   Intake 750 ml   Output 1050 ml   Net -300 ml       Physical Exam:   Physical Exam  Vitals and nursing note reviewed.   Constitutional:       General: She is not in acute distress.     Appearance: She is well-developed.   HENT:      Head: Normocephalic and atraumatic.   Eyes:      Conjunctiva/sclera: Conjunctivae normal.   Cardiovascular:      Rate and Rhythm: Normal rate and regular rhythm.      Heart sounds: No murmur heard.  Pulmonary:      Effort: Pulmonary effort is normal. No respiratory distress.      Breath sounds: Normal breath sounds.   Abdominal:      Palpations: Abdomen is soft.      Tenderness: There is no abdominal tenderness.   Musculoskeletal:         General: No swelling.      Cervical back: Neck supple.   Skin:     General: Skin is warm and dry.      Capillary Refill: Capillary refill takes less than 2 seconds.   Neurological:      Mental Status: She is alert.   Psychiatric:         Mood and Affect: Mood normal.          Additional Data:     Labs:  Results from last 7 days   Lab Units 24  0504 24  0539  06/24/24  1750   WBC Thousand/uL 6.16   < > 10.86*   HEMOGLOBIN g/dL 13.8   < > 15.6*   HEMATOCRIT % 40.9   < > 45.3   PLATELETS Thousands/uL 212   < > 248   SEGS PCT %  --   --  75   LYMPHO PCT %  --   --  14   MONO PCT %  --   --  10   EOS PCT %  --   --  0    < > = values in this interval not displayed.     Results from last 7 days   Lab Units 06/27/24  0504   SODIUM mmol/L 138   POTASSIUM mmol/L 3.8   CHLORIDE mmol/L 104   CO2 mmol/L 27   BUN mg/dL 18   CREATININE mg/dL 0.68   ANION GAP mmol/L 7   CALCIUM mg/dL 8.3*   GLUCOSE RANDOM mg/dL 93     Results from last 7 days   Lab Units 06/25/24  0450   INR  1.12     Results from last 7 days   Lab Units 06/26/24  2103   POC GLUCOSE mg/dl 93               Lines/Drains:  Invasive Devices       Peripheral Intravenous Line  Duration             Peripheral IV 06/25/24 Dorsal (posterior);Right Forearm 1 day              Drain  Duration             Urethral Catheter Latex 16 Fr. <1 day                               Imaging: No pertinent imaging reviewed.    Recent Cultures (last 7 days):         Last 24 Hours Medication List:   Current Facility-Administered Medications   Medication Dose Route Frequency Provider Last Rate    acetaminophen  650 mg Oral Q6H PRKARINA Holm PA-C      calcium carbonate-vitamin D  1 tablet Oral Daily With Breakfast Erik Holm PA-C      ketorolac  15 mg Intravenous Q6H [facility] Erik Holm PA-C      morphine injection  2 mg Intravenous Q4H PRKARINA Holm PA-C      morphine injection  4 mg Intravenous Q4H PRKARINA Holm PA-C      ondansetron  4 mg Intravenous Q6H PRKARINA Holm PA-C          Today, Patient Was Seen By: Melissa Tavarez PA-C    **Please Note: This note may have been constructed using a voice recognition system.**

## 2024-06-27 NOTE — PHYSICAL THERAPY NOTE
Physical Therapy Cancellation Note       06/27/24 08   Note Type   Note type Cancelled Session   Cancel Reasons Patient to operating room  (Pt to OR today for L total hip arthroplasty (cancelled last night due to power outage). Will follow-up post-operatively as tolerated/appropriate.)   Licensure   [place] License Number  Marisol Ni [id number]

## 2024-06-27 NOTE — PLAN OF CARE
Problem: PAIN - ADULT  Goal: Verbalizes/displays adequate comfort level or baseline comfort level  Description: Interventions:  - Encourage patient to monitor pain and request assistance  - Assess pain using appropriate pain scale  - Administer analgesics based on type and severity of pain and evaluate response  - Implement non-pharmacological measures as appropriate and evaluate response  - Consider cultural and social influences on pain and pain management  - Notify physician/advanced practitioner if interventions unsuccessful or patient reports new pain  Outcome: Progressing     Problem: INFECTION - ADULT  Goal: Absence or prevention of progression during hospitalization  Description: INTERVENTIONS:  - Assess and monitor for signs and symptoms of infection  - Monitor lab/diagnostic results  - Monitor all insertion sites, i.e. indwelling lines, tubes, and drains  - Administer medications as ordered  - Instruct and encourage patient and family to use good hand hygiene technique  - Identify and instruct in appropriate isolation precautions for identified infection/condition  Outcome: Progressing     Problem: SAFETY ADULT  Goal: Patient will remain free of falls  Description: INTERVENTIONS:  - Educate patient/family on patient safety including physical limitations  - Instruct patient to call for assistance with activity   - Consult OT/PT to assist with strengthening/mobility   - Keep Call bell within reach  - Keep bed low and locked with side rails adjusted as appropriate  - Keep care items and personal belongings within reach  - Initiate and maintain comfort rounds  - Make Fall Risk Sign visible to staff  - Offer Toileting every 2 Hours, in advance of need  - Initiate/Maintain bed alarm  - Obtain necessary fall risk management equipment: bed alarm  - Apply yellow socks and bracelet for high fall risk patients  - Consider moving patient to room near nurses station  Outcome: Progressing

## 2024-06-27 NOTE — PROGRESS NOTES
"Progress Note - Orthopedics   Liliana Zheng 63 y.o. female MRN: [id number]  Unit/Bed#: OR POOL Encounter: [id number]    Assessment:  POD#0 s/p direct anterior left CATHIE for fracture    Plan:  Ancef 2g IV x 2 for 24 hours postop  DVT prophylaxis: Aspirin 325mg BID for 6 weeks/SCD's/Ambulation  PT/OT- WBAT LLE  Analgesia PRN  Follow up AM labs  D/c linda in AM POD #1  Post op medical management per primary team  Dressing- monitor for drainage, Mepilex may remain in place 7 days  Discharge planning - pending therapy    Weight bearing: as tolerated    VTE Pharmacologic Prophylaxis: Aspirin 325mg BID  VTE Mechanical Prophylaxis: sequential compression device    Subjective: Patient seen and examined in the PACU.  Extubated.    Vitals: Blood pressure 145/80, pulse 77, temperature 98.8 °F (37.1 °C), temperature source Oral, resp. rate 18, height 5' 8\" (1.727 m), weight 66.8 kg (147 lb 4.8 oz), SpO2 96%.,Body mass index is 22.4 kg/m².      Intake/Output Summary (Last 24 hours) at 6/27/2024 1521  Last data filed at 6/27/2024 1443  Gross per 24 hour   Intake 1650 ml   Output 1630 ml   Net 20 ml       Invasive Devices       Peripheral Intravenous Line  Duration             Peripheral IV 06/25/24 Dorsal (posterior);Right Forearm 2 days              Drain  Duration             Urethral Catheter Latex 16 Fr. <1 day                    Physical Exam: General: NAD  Ortho Exam: LLE: anterior hip dsg c/d/I, compartments soft, thigh and calf nontender, +SILT L2-S1, +EHL/DF/PF, foot warm, palpable pulse    Lab, Imaging and other studies:     Postoperative x-rays reviewed and acceptable    Luis Eduardo Martinez PA-C      "

## 2024-06-27 NOTE — OCCUPATIONAL THERAPY NOTE
Occupational Therapy Cancellation     Patient Name: Liliana Zheng  Today's Date: 6/27/2024  Problem List  Principal Problem:    Left displaced femoral neck fracture (HCC)  Active Problems:    Age-related osteoporosis with current pathological fracture with malunion    Fall    Past Medical History  History reviewed. No pertinent past medical history.  Past Surgical History  History reviewed. No pertinent surgical history.        06/27/24 0743   Note Type   Note type Cancelled Session   Cancel Reasons Patient to operating room   Additional Comments Chart review completed. Pt to OR today for L hip arthroplasty. Will cancel and continue to follow as appropriate post operatively.   Licensure   [place] License Number  Starr Tellez, OTR/L [id number]     Starr Tellez, OTR/L  [id number]  [id number]

## 2024-06-27 NOTE — ANESTHESIA PREPROCEDURE EVALUATION
Procedure:  ARTHROPLASTY HIP TOTAL ANTERIOR (Left: Hip)    Relevant Problems   ANESTHESIA (within normal limits)      CARDIO (within normal limits)      PULMONARY (within normal limits)        Physical Exam    Airway    Mallampati score: II  TM Distance: >3 FB  Neck ROM: full     Dental       Cardiovascular  Rhythm: regular, Rate: normal    Pulmonary   Breath sounds clear to auscultation    Other Findings  post-pubertal.      Anesthesia Plan  ASA Score- 2 Emergent    Anesthesia Type- general with ASA Monitors.         Additional Monitors:     Airway Plan: ETT.    Comment: GA with ETT;  post-procedure PENG block prior to wake up.       Plan Factors-Exercise tolerance (METS): >4 METS.    Chart reviewed.   Existing labs reviewed. Patient summary reviewed.    Patient is not a current smoker.              Induction- intravenous.    Postoperative Plan- Plan for postoperative opioid use.     Perioperative Resuscitation Plan - Level 1 - Full Code.       Informed Consent- Anesthetic plan and risks discussed with patient.  I personally reviewed this patient with the CRNA. Discussed and agreed on the Anesthesia Plan with the CRNA..

## 2024-06-27 NOTE — ANESTHESIA POSTPROCEDURE EVALUATION
Post-Op Assessment Note    CV Status:  Stable  Pain Score: 0    Pain management: adequate       Mental Status:  Awake   Hydration Status:  Stable   PONV Controlled:  None   Airway Patency:  Patent     Post Op Vitals Reviewed: Yes    No anethesia notable event occurred.    Staff: Anesthesiologist               BP  163/77   Temp   97.3   Pulse  75   Resp      SpO2   97%

## 2024-06-27 NOTE — PLAN OF CARE
Problem: PAIN - ADULT  Goal: Verbalizes/displays adequate comfort level or baseline comfort level  Description: Interventions:  - Encourage patient to monitor pain and request assistance  - Assess pain using appropriate pain scale  - Administer analgesics based on type and severity of pain and evaluate response  - Implement non-pharmacological measures as appropriate and evaluate response  - Consider cultural and social influences on pain and pain management  - Notify physician/advanced practitioner if interventions unsuccessful or patient reports new pain  Outcome: Progressing     Problem: MUSCULOSKELETAL - ADULT  Goal: Maintain proper alignment of affected body part  Description: INTERVENTIONS:  - Support, maintain and protect limb and body alignment  - Provide patient/ family with appropriate education  Outcome: Progressing     Problem: MUSCULOSKELETAL - ADULT  Goal: Maintain or return mobility to safest level of function  Description: INTERVENTIONS:  - Assess patient's ability to carry out ADLs; assess patient's baseline for ADL function and identify physical deficits which impact ability to perform ADLs (bathing, care of mouth/teeth, toileting, grooming, dressing, etc.)  - Assess/evaluate cause of self-care deficits   - Assess range of motion  - Assess patient's mobility  - Assess patient's need for assistive devices and provide as appropriate  - Encourage maximum independence but intervene and supervise when necessary  - Involve family in performance of ADLs  - Assess for home care needs following discharge   - Consider OT consult to assist with ADL evaluation and planning for discharge  - Provide patient education as appropriate  Outcome: Progressing     Problem: SAFETY ADULT  Goal: Patient will remain free of falls  Description: INTERVENTIONS:  - Educate patient/family on patient safety including physical limitations  - Instruct patient to call for assistance with activity   - Consult OT/PT to assist with  strengthening/mobility   - Keep Call bell within reach  - Keep bed low and locked with side rails adjusted as appropriate  - Keep care items and personal belongings within reach  - Initiate and maintain comfort rounds  - Make Fall Risk Sign visible to staff  - Offer Toileting every 2 Hours, in advance of need  - Initiate/Maintain bed alarm  - Obtain necessary fall risk management equipment: bed alarm  - Apply yellow socks and bracelet for high fall risk patients  - Consider moving patient to room near nurses station  Outcome: Progressing

## 2024-06-28 VITALS
RESPIRATION RATE: 16 BRPM | DIASTOLIC BLOOD PRESSURE: 72 MMHG | OXYGEN SATURATION: 96 % | TEMPERATURE: 98 F | BODY MASS INDEX: 22.32 KG/M2 | HEART RATE: 106 BPM | SYSTOLIC BLOOD PRESSURE: 117 MMHG | HEIGHT: 68 IN | WEIGHT: 147.3 LBS

## 2024-06-28 PROBLEM — M80.00XP: Status: RESOLVED | Noted: 2024-06-24 | Resolved: 2024-06-28

## 2024-06-28 PROBLEM — S72.002A LEFT DISPLACED FEMORAL NECK FRACTURE (HCC): Status: RESOLVED | Noted: 2024-06-24 | Resolved: 2024-06-28
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
## 2024-06-28 LAB
ANION GAP SERPL CALCULATED.3IONS-SCNC: 5 MMOL/L (ref 4–13)
BUN SERPL-MCNC: 20 MG/DL (ref 5–25)
CALCIUM SERPL-MCNC: 7.9 MG/DL (ref 8.4–10.2)
CHLORIDE SERPL-SCNC: 103 MMOL/L (ref 96–108)
CO2 SERPL-SCNC: 28 MMOL/L (ref 21–32)
CREAT SERPL-MCNC: 0.82 MG/DL (ref 0.6–1.3)
DME PARACHUTE DELIVERY DATE REQUESTED: NORMAL
DME PARACHUTE DELIVERY DATE REQUESTED: NORMAL
DME PARACHUTE ITEM DESCRIPTION: NORMAL
DME PARACHUTE ITEM DESCRIPTION: NORMAL
DME PARACHUTE ORDER STATUS: NORMAL
DME PARACHUTE ORDER STATUS: NORMAL
DME PARACHUTE SUPPLIER NAME: NORMAL
DME PARACHUTE SUPPLIER NAME: NORMAL
DME PARACHUTE SUPPLIER PHONE: NORMAL
DME PARACHUTE SUPPLIER PHONE: NORMAL
ERYTHROCYTE [DISTWIDTH] IN BLOOD BY AUTOMATED COUNT: 11.9 % (ref 11.6–15.1)
GFR SERPL CREATININE-BSD FRML MDRD: 76 ML/MIN/1.73SQ M
GLUCOSE SERPL-MCNC: 110 MG/DL (ref 65–140)
HCT VFR BLD AUTO: 36.9 % (ref 34.8–46.1)
HGB BLD-MCNC: 12.6 G/DL (ref 11.5–15.4)
MCH RBC QN AUTO: 30.7 PG (ref 26.8–34.3)
MCHC RBC AUTO-ENTMCNC: 34.1 G/DL (ref 31.4–37.4)
MCV RBC AUTO: 90 FL (ref 82–98)
PLATELET # BLD AUTO: 200 THOUSANDS/UL (ref 149–390)
PMV BLD AUTO: 10.8 FL (ref 8.9–12.7)
POTASSIUM SERPL-SCNC: 4.1 MMOL/L (ref 3.5–5.3)
RBC # BLD AUTO: 4.11 MILLION/UL (ref 3.81–5.12)
SODIUM SERPL-SCNC: 136 MMOL/L (ref 135–147)
WBC # BLD AUTO: 10.12 THOUSAND/UL (ref 4.31–10.16)

## 2024-06-28 PROCEDURE — 99239 HOSP IP/OBS DSCHRG MGMT >30: CPT | Performed by: INTERNAL MEDICINE

## 2024-06-28 PROCEDURE — 97165 OT EVAL LOW COMPLEX 30 MIN: CPT

## 2024-06-28 PROCEDURE — 80048 BASIC METABOLIC PNL TOTAL CA: CPT | Performed by: PHYSICIAN ASSISTANT

## 2024-06-28 PROCEDURE — 97162 PT EVAL MOD COMPLEX 30 MIN: CPT

## 2024-06-28 PROCEDURE — 99024 POSTOP FOLLOW-UP VISIT: CPT | Performed by: PHYSICIAN ASSISTANT

## 2024-06-28 PROCEDURE — 97535 SELF CARE MNGMENT TRAINING: CPT

## 2024-06-28 PROCEDURE — 97116 GAIT TRAINING THERAPY: CPT

## 2024-06-28 PROCEDURE — 85027 COMPLETE CBC AUTOMATED: CPT | Performed by: PHYSICIAN ASSISTANT

## 2024-06-28 RX ORDER — LANOLIN ALCOHOL/MO/W.PET/CERES
1 CREAM (GRAM) TOPICAL
Qty: 30 TABLET | Refills: 0 | Status: SHIPPED | OUTPATIENT
Start: 2024-06-29 | End: 2024-07-29

## 2024-06-28 RX ORDER — ASPIRIN 325 MG
325 TABLET ORAL 2 TIMES DAILY
Qty: 84 TABLET | Refills: 0 | Status: SHIPPED | OUTPATIENT
Start: 2024-06-28 | End: 2024-08-09

## 2024-06-28 RX ADMIN — ACETAMINOPHEN 975 MG: 325 TABLET ORAL at 08:18

## 2024-06-28 RX ADMIN — CEFAZOLIN SODIUM 2000 MG: 2 SOLUTION INTRAVENOUS at 03:40

## 2024-06-28 RX ADMIN — GABAPENTIN 200 MG: 100 CAPSULE ORAL at 08:18

## 2024-06-28 RX ADMIN — PANTOPRAZOLE SODIUM 40 MG: 40 TABLET, DELAYED RELEASE ORAL at 08:18

## 2024-06-28 RX ADMIN — Medication 1 TABLET: at 08:18

## 2024-06-28 RX ADMIN — ACETAMINOPHEN 975 MG: 325 TABLET ORAL at 00:20

## 2024-06-28 RX ADMIN — DOCUSATE SODIUM 100 MG: 100 CAPSULE, LIQUID FILLED ORAL at 08:18

## 2024-06-28 RX ADMIN — ASPIRIN 325 MG: 325 TABLET ORAL at 08:18

## 2024-06-28 RX ADMIN — ACETAMINOPHEN 975 MG: 325 TABLET ORAL at 16:00

## 2024-06-28 NOTE — PROGRESS NOTES
"Progress Note - Orthopedics   Liliana Zheng 63 y.o. female MRN: [id number]  Unit/Bed#: [address] Encounter: [id number]        Subjective: POD #1 status post left CATHIE for fracture. She note her pain has been well controlled. She has been up ambulating with her walker. She notes good sensation of the LLE. No acute overnight events.     Vitals: Blood pressure 147/84, pulse 71, temperature 98 °F (36.7 °C), temperature source Oral, resp. rate 16, height 5' 8\" (1.727 m), weight 66.8 kg (147 lb 4.8 oz), SpO2 99%.,Body mass index is 22.4 kg/m².      Intake/Output Summary (Last 24 hours) at 6/28/2024 1410  Last data filed at 6/28/2024 0818  Gross per 24 hour   Intake 2031.25 ml   Output 1255 ml   Net 776.25 ml       Invasive Devices       Peripheral Intravenous Line  Duration             Peripheral IV 06/25/24 Dorsal (posterior);Right Forearm 3 days                        Ortho Exam: Patient alert and oriented X 3 in NAD, Standing in room with walker.  Dressing CDI. Mild swelling  thigh.  Compartments soft. No calf tenderness. Moves toes/ankle freely. 2+ DP pulse. Sensation intact lateral femoral cutaneous, saphenous, sural, deep/superficial peroneal nerve distributions.     Lab, Imaging and other studies: I have personally reviewed pertinent lab results.  CBC:   Lab Results   Component Value Date    WBC 10.12 06/28/2024    HGB 12.6 06/28/2024    HCT 36.9 06/28/2024    MCV 90 06/28/2024     06/28/2024    RBC 4.11 06/28/2024    MCH 30.7 06/28/2024    MCHC 34.1 06/28/2024    RDW 11.9 06/28/2024    MPV 10.8 06/28/2024    NRBC 0 06/24/2024     CMP:   Lab Results   Component Value Date     06/28/2024    CO2 28 06/28/2024    BUN 20 06/28/2024    CREATININE 0.82 06/28/2024    CALCIUM 7.9 (L) 06/28/2024    EGFR 76 06/28/2024     PT/INR:   Lab Results   Component Value Date    INR 1.12 06/25/2024       Assessment:  POD#1 s/p direct anterior left CATHIE for fracture     Plan:  Ancef 2g IV x 2 for 24 hours " postop-completed  DVT prophylaxis: Aspirin 325mg BID for 6 weeks/SCD's/Ambulation  PT/OT- WBAT LLE  Analgesia PRN  Post op medical management per primary team  Dressing- monitor for drainage, Mepilex may remain in place 7 days  Discharge planning - Will be disharged home today. FU oupatient with Dr. Cueva in 1 week.

## 2024-06-28 NOTE — OCCUPATIONAL THERAPY NOTE
Occupational Therapy Evaluation     Patient Name: Liliana Zheng  Today's Date: 6/28/2024  Problem List  Principal Problem:    Left displaced femoral neck fracture (HCC)  Active Problems:    Age-related osteoporosis with current pathological fracture with malunion    Fall    Past Medical History  History reviewed. No pertinent past medical history.  Past Surgical History  Past Surgical History:   Procedure Laterality Date    ARTHROPLASTY HIP TOTAL ANTERIOR Left 6/27/2024    Procedure: ARTHROPLASTY HIP TOTAL ANTERIOR;  Surgeon: Twan Cueva DO;  Location: [facility];  Service: Orthopedics        06/28/24 0915   OT Last Visit   OT Visit Date 06/28/24  (Friday)   Note Type   Note type Evaluation  (Eval 8838-6263; tx 7134-9825)   Additional Comments Identified pt by full name and birthdate   Pain Assessment   Pain Assessment Tool 0-10   Pain Score 2  (upon arrival resting supine HOB elevated)   Pain Location/Orientation Orientation: Left;Location: Hip;Location: Leg   Effect of Pain on Daily Activities limits mobility and I w/ LB ADLs, functional transfers   Patient's Stated Pain Goal No pain   Hospital Pain Intervention(s) Cold applied;Repositioned;Ambulation/increased activity;Emotional support  (RN, Talya / ERIC, Kinga medicated prior to eval / tx)   Restrictions/Precautions   Weight Bearing Precautions Per Order Yes   LLE Weight Bearing Per Order WBAT  (s/p L ant CATHIE w/ Dr. Cueva on 6/27/24)   Other Precautions Bed Alarm;WBS;Fall Risk;Pain  ([place], room air)   Home Living   Type of Home House   Home Layout Multi-level;Stairs to enter with rails;Bed/bath upstairs;Laundry in basement  (2 NELSON to preferred back entrance)   Bathroom Shower/Tub Tub/shower unit   Bathroom Toilet Standard   Bathroom Equipment   (no DME)   Bathroom Accessibility Accessible  (up flight of stairs in 2nd floor. No bathroom on main / entry level)   Home Equipment Cane;Crutches   Additional Comments Pt reports living w/ her   "in a multi level home w/ 2 NELSON   Prior Function   Level of [place] Independent with ADLs;Independent with functional mobility;Independent with IADLS   Lives With Spouse  (10 yo [place])   Receives Help From   (supportive family; daughter will be moving locally)   IADLs Independent with driving;Independent with meal prep;Independent with medication management   Falls in the last 6 months 1 to 4   Vocational Full time employment   Comments Pt reports I and active lifestyle at baseline w/ out use of AD. Pt reports use of cane vs crutches since initial fall   Lifestyle   Autonomy Pt reports I w/ ADL/ IADL w/ out use of AD or DME   Reciprocal Relationships Pt reports living w/ her  and their dog   Service to Others Pt reports working 6 days a week as  at [employer]   Intrinsic Gratification Pt reports enjoying reading, kayaking and walking   General   Additional Pertinent History Pt admitted to [facility] on 6/24/24 from home following 3 falls. Diagnosed w/ L displaced femoral neck fracture. Pt is s/p L ant CATHIE on 6/27/24 w/ Dr. Cueva   Family/Caregiver Present No   Additional General Comments Personal and environmental factors supporting performance includes independent at baseline, supportive family; barriers include increased pain, multi level home, difficulty completing IADLs   Subjective   Subjective \"I am so sweaty and the sheets are sticking to me\"   ADL   Where Assessed Edge of bed  (vs OOB in bedside recliner chair)   Eating Assistance 7  Independent   Eating Deficit Setup   Grooming Assistance 6  Modified Independent   Grooming Deficit Setup;Increased time to complete  (seated unsupported at EOB after set- up to wash face)   UB Bathing Assistance Unable to assess  (anticipate mod I seated based on fxal obs skills, clinical judgement)   LB Bathing Assistance Unable to assess  (anticipate min A seated based on fxal obs skills, clinical judgement)   UB Dressing Assistance 6  Modified " independent   UB Dressing Deficit Setup;Increased time to complete   LB Dressing Assistance 4  Minimal Assistance  (max A to don socks)   LB Dressing Deficit Thread LLE into pants;Thread LLE into underwear;Requires assistive device for steadying;Setup;Increased time to complete;Verbal cueing;Supervision/safety   Toileting Assistance  Unable to assess  (denied need to void; anticipate min A using commode over toilet in bathroom)   Additional Comments Educated pt on tech to complete LBD while seated threading L LE first   Bed Mobility   Supine to Sit 4  Minimal assistance   Additional items Assist x 1;HOB elevated;Bedrails;Increased time required;Verbal cues;LE management  (L LE mgmt to pt's L)   Sit to Supine Unable to assess   Additional Comments Pt seated OOB In chair post eval w/ needs met, call bell in reach on soft care cushion.   Transfers   Sit to Stand 4  Minimal assistance   Additional items Assist x 1;Bedrails;Armrests;Increased time required;Verbal cues   Stand to Sit 4  Minimal assistance   Additional items Assist x 1;Increased time required;Verbal cues;Bedrails;Armrests   Additional Comments Pt performed sit <> stand 2 X w/ min A   Functional Mobility   Functional Mobility 4  Minimal assistance   Additional Comments engaged in functional mobility few feet from EOB to bedside recliner chair w/ min A using RW   Additional items Rolling walker   Balance   Static Sitting Fair +   Dynamic Sitting Fair   Static Standing Fair   Ambulatory Fair -   Activity Tolerance   Activity Tolerance Patient limited by pain   Medical Staff Made Aware spoke w/ PTIram and coordinated care for initial sit to stand due to decreased activity tolerance, regression from baseline. Spoke w/ Josie MEDINA   Nurse Made Aware spoke w/ Talya BRANDON / Kinga REYES   RURAZIA Assessment   RUE Assessment WFL   RUE Strength   RUE Overall Strength Within Functional Limits - able to perform ADL tasks with strength   LUE Assessment   LUE Assessment WFL    LUE Strength   LUE Overall Strength Within Functional Limits - able to perform ADL tasks with strength   Hand Function   Gross Motor Coordination Functional   Fine Motor Coordination Functional   Sensation   Light Touch No apparent deficits   Vision-Basic Assessment   Current Vision Wears glasses all the time   Cognition   Overall Cognitive Status WFL   Arousal/Participation Alert;Cooperative   Attention Attends with cues to redirect   Orientation Level Oriented X4   Memory Within functional limits   Following Commands Follows multistep commands without difficulty   Comments Alert, generally oriented and able to follow directions during ADLs w/ + time. Benefits from reassurance / education throughout   Assessment   Limitation Decreased ADL status;Decreased endurance;Decreased high-level ADLs;Decreased self-care trans  (impaired activity tolerance, standing tolerance, L LE ROM /strength, balance)   Assessment Pt is a 64yo female admitted to [facility] on 6/24/24 from home following multiple falls. Diagnosed w/ left displaced femoral neck fracture and is s/p L ant CATHIE w/ Dr. Cueva on 6/27/24. Per orthopedics, pt is WBAT L LE. Pt reports living w/ and their dog in multi level home 2nd floor bathroom/ bedroom and 2 NELSON. Pt reports I w/ ADL/ IADL at baseline w/ out use of AD or DME. Pt was using cane vs crutches since initial fall. No significant PMH impacting her occupational performance. Upon eval, pt alert and oriented. Able to follow directions and communicate wants / needs w/ increased time. Pt demonstrated B UE AROM / strength WFL to complete ADLs. Pt required mod I grooming seated, mod I UBD, min A LBD, min A bed mobility, min A sit <> stand and min A using RW for short distance functional mobility. Pt is completing ADLs below baseline level of I w/ increased pain, anticipated L LE ROM / strength deficits, impaired standing tolerance / balance, forward functional reach. Pt would benefit from OT in acute care to  max I w/ ADLs and improve engagement. Recommend level III rehab resource intensity when medically stable. Will continue to follow   Goals   Patient Goals Pt stated that she would like to return home to baseline level of I, be able to put her socks on   Plan   Treatment Interventions ADL retraining;Functional transfer training;Endurance training;Patient/family training;Equipment evaluation/education;Compensatory technique education;Energy conservation;Continued evaluation;Activityengagement   Goal Expiration Date 07/08/24   OT Treatment Day 0  (Friday 6/28/24)   OT Frequency 3-5x/wk   Discharge Recommendation   Rehab Resource Intensity Level, OT III (Minimum Resource Intensity)   AM-PAC Daily Activity Inpatient   Lower Body Dressing 2   Bathing 3   Toileting 3   Upper Body Dressing 4   Grooming 4   Eating 4   Daily Activity Raw Score 20   Daily Activity Standardized Score (Calc for Raw Score >=11) 42.03   AM-PAC Applied Cognition Inpatient   Following a Speech/Presentation 4   Understanding Ordinary Conversation 4   Taking Medications 4   Remembering Where Things Are Placed or Put Away 4   Remembering List of 4-5 Errands 4   Taking Care of Complicated Tasks 4   Applied Cognition Raw Score 24   Applied Cognition Standardized Score 62.21   Barthel Index   Feeding 10   Bathing 0   Grooming Score 5   Dressing Score 5   Bladder Score 10   Bowels Score 10   Toilet Use Score 5   Transfers (Bed/Chair) Score 10   Mobility (Level Surface) Score 0   Stairs Score 0   Barthel Index Score 55   Additional Treatment Session   Start Time 0851   End Time 0915   Treatment Assessment Pt seen for skilled OT tx sesssion day 1 following eval. Pt seated OOB in bedside recliner chair and agreeable to participate. Pt reports she would like to get washed up. Pt engaged in UB bathing w/ mod I for set- up while seated unsupported at edge of recliner chair. Pt required S after set- up to complete LB bathing in stance using RW w/ + time. Pt  demonstrated fair static standing balance. Pt completed toilet transfer to commode w/ S and + time / VC's for hand placement. Pt complete clohting mgmt and personal hygiene w/ S after voiding. Recommend commode over toilet in bathroom w/ RN staff. Pt engaged in functional mobility using RW w/ S and + time. Pt seated OOB in chair at end of session w/ needs met, call bell in reach. Continue to recommend level III rehab resource intensity when medically stable for discharge from acute care. Will continue to follow   Additional Treatment Day 1  (Friday 6/28/24)   End of Consult   Education Provided Yes;Family or social support of family present for education by provider   Patient Position at End of Consult Bedside chair;All needs within reach   Nurse Communication Nurse aware of consult   Licensure   [place] License Number  Starr THOMAS Martinezkallie OTR/L [id number]        The patient's raw score on the AM-PAC Daily Activity Inpatient Short Form is 20. A raw score of greater than or equal to 19 suggests the patient may benefit from discharge to home. Please refer to the recommendation of the Occupational Therapist for safe discharge planning.      Pt goals to be met by 7/8/24 to max I w/ ADLs and improve engagement to return home to baseline level of I, be able to put her socks on includes:    -Pt will complete bed mobility supine <> sit w/ mod I for + time in preparation for ADLs    -Pt will complete functional transfers to all surfaces w/ mod I using LRAD, DME as needed    -Pt will complete LBD w/ mod I using LHAE as needed    -Pt will consistently engage in functional mobility using LRAD as needed household distances independently    -Pt will demonstrate improved functional standing tolerance for at least 20 minutes using LRAD as needed w/ at least fair balance while engaged in grooming w/ mod I in stance to max I w/ light IADLs and pet care to return home    STG to be met in 3-5 days to max I w/ ADLs to return home  includes:    -Pt will complete bed mobility supine <> sit w/ S in preparation for ADLs    -Pt will complete functional transfers to bed, chair, and toilet using LRAD, DME as needed w/ mod I    -Pt will complete tub / shower transfer w/ S using LRAD, DME as needed to max I w/ bathing    -Pt will consistently engage in functional mobility household distances w/ S using LRAD    -Pt will demonstrate improved activity and sitting tolerance OOB In chair for all meals    -Pt will demonstrate improved AMPAC score to at least 22 to max I w/ ADLs and improve engagement    -Pt will demonstrate improved functional standing tolerance for at least 10 minutes w/ fair balance while engaged in grooming w/ S in stance to max I w/ ADLs to assist w/ DC planning    Starr Tellez OTR/L  [id number]  [id number]

## 2024-06-28 NOTE — PHYSICAL THERAPY NOTE
"       PHYSICAL THERAPY EVALUATION/TREATMENT     06/28/24 1155   PT Last Visit   PT Visit Date 06/28/24   Note Type   Note type Evaluation   Pain Assessment   Pain Assessment Tool 0-10   Pain Score No Pain  (:\"stiff\")   Restrictions/Precautions   Weight Bearing Precautions Per Order Yes   LLE Weight Bearing Per Order WBAT   Other Precautions Chair Alarm;Bed Alarm;Fall Risk;Pain   Home Living   Type of Home House   Home Layout Multi-level;Stairs to enter with rails;Bed/bath upstairs;Laundry in basement  (no bathroom on first level; 17 + 19 steps from front entrance; 2 NELSON from back entrance)   Bathroom Shower/Tub Tub/shower unit   Additional Comments Pt reports that she has a cane but not a good one; pt issued a cane for use on stairs.  Pt does not have a RW or a commode which she could benefit from.   Prior Function   Level of [place] Independent with ADLs;Independent with functional mobility;Independent with IADLS   Lives With Spouse  (and a dog)   Receives Help From Family   IADLs Independent with driving;Independent with meal prep;Independent with medication management   Falls in the last 6 months 1 to 4   Comments Pt states that at baseline, she likes to go hiking.   General   Additional Pertinent History Pt is a 63 year old female admitted due to a fall at home.  Pt is s/p left anterior CATHIE on 6/27/24.   PMH: unremarkable   Family/Caregiver Present No   Cognition   Overall Cognitive Status WFL   Arousal/Participation Cooperative   Attention Attends with cues to redirect   Orientation Level Oriented X4   Memory Within functional limits   Following Commands Follows all commands and directions without difficulty   Subjective   Subjective Pt reports that her leg feels \"stiff\" and \"heavy\" but no pain.   RLE Assessment   RLE Assessment WFL  (rom and strenght WFLs)   LLE Assessment   LLE Assessment WFL  (ROM: WFLs with AAROM; strength: hip grossly 4-/5, knee: 4+/5 and ankle WFLs.)   Bed Mobility   Additional " Comments Pt presents sitting in bedside chair   Transfers   Sit to Stand 5  Supervision   Additional items Verbal cues  (cues to push from armrests for sit <> stand)   Stand to Sit 5  Supervision   Additional items Assist x 1;Verbal cues;Increased time required   Ambulation/Elevation   Gait pattern Step through pattern;Decreased L stance;Short stride   Gait Assistance 6  Modified independent   Additional items Verbal cues   Assistive Device Rolling walker   Distance 25 feet   Stair Management Assistance 6  Modified independent   Additional items Verbal cues   Stair Management Technique One rail L;With cane;Step to pattern   Number of Stairs 8   Balance   Static Sitting Fair +   Dynamic Sitting Fair   Static Standing Fair +   Dynamic Standing Fair   Ambulatory Fair  (with RW)   Activity Tolerance   Activity Tolerance Patient limited by pain;Patient limited by fatigue;Patient tolerated treatment well   Medical Staff Made Aware spoke to Starr COSME   Nurse Made Aware yes: Alley / Talya  RNs.   Assessment   Prognosis Good   Problem List Decreased strength;Decreased endurance;Impaired balance;Decreased mobility;Decreased skin integrity;Pain   Assessment Patient seen for Physical Therapy evaluation. Patient admitted with Left displaced femoral neck fracture (HCC).  Comorbidities affecting patient's physical performance include: unremarkable.    Personal factors affecting patient at time of initial evaluation include: lives in two story house, stairs to enter home, inability to perform current job functions, inability to perform ADLS, and inability to perform IADLS . Prior to admission, patient was independent with functional mobility without assistive device, independent with ADLS, independent with IADLS, living with  in a two level home with 2 NELSON from back, 17+19  steps to enter from front, ambulating household distance, ambulating community distances, and works full time.  Please find objective findings from  Physical Therapy assessment regarding body systems outlined above with impairments and limitations including weakness, decreased endurance, gait deviations, pain, decreased activity tolerance, decreased functional mobility tolerance, fall risk, and decreased skin integrity.  The Barthel Index was used as a functional outcome tool presenting with a score of Barthel Index Score: 80 today indicating marked limitations of functional mobility and ADLS.  Patient's clinical presentation is currently evolving as seen in patient's presentation of changing level of pain, increased fall risk, new onset of impairment of functional mobility, decreased endurance, and new onset of weakness. Pt would benefit from continued Physical Therapy treatment to address deficits as defined above and maximize level of functional mobility. As demonstrated by objective findings, the assigned level of complexity for this evaluation is moderate.The patient's -[facility] Basic Mobility Inpatient Short Form Raw Score is 19. A Raw score of greater than 16 suggests the patient may benefit from discharge to home with OP PT. Please also refer to the recommendation of the Physical Therapist for safe discharge planning.   Goals   Patient Goals to return home soon   STG Expiration Date 07/05/24   Short Term Goal #1 Indep transfers supine <> short sit and sit <> stand with use of RW   Short Term Goal #2 Indep amb. on level surfaces with Rw and fair + walker   LTG Expiration Date 07/12/24   Long Term Goal #1 Indep amb. on level surfaces with RW and good balance.   Long Term Goal #2 Indep navigation of stairs with one railing and a cane.   Plan   Treatment/Interventions Functional transfer training;LE strengthening/ROM;Elevations;Therapeutic exercise;Endurance training;Patient/family training;Equipment eval/education;Bed mobility;Gait training;Spoke to nursing;Spoke to case management;OT   PT Frequency Other (Comment)  (daily)   Discharge Recommendation   Rehab  Resource Intensity Level, PT III (Minimum Resource Intensity)   AM-PAC Basic Mobility Inpatient   Turning in Flat Bed Without Bedrails 3   Lying on Back to Sitting on Edge of Flat Bed Without Bedrails 2   Moving Bed to Chair 3   Standing Up From Chair Using Arms 4   Walk in Room 4   Climb 3-5 Stairs With Railing 3   Basic Mobility Inpatient Raw Score 19   Basic Mobility Standardized Score 42.48   [facility] Highest Level Of Mobility   JH-HLM Goal 6: Walk 10 steps or more   JH-HLM Achieved 8: Walk 250 feet ot more   Barthel Index   Feeding 10   Bathing 0   Grooming Score 5   Dressing Score 5   Bladder Score 10   Bowels Score 10   Toilet Use Score 5   Transfers (Bed/Chair) Score 15   Mobility (Level Surface) Score 15   Stairs Score 5   Barthel Index Score 80   Additional Treatment Session   Start Time 1140   End Time 1155   Treatment Assessment Pt Presents in chair, agreeable to PT.  Pt sit <> stand with verbal cues to RW.  Pt able to ambulate in room with CG/CS.  Pt progressed and ambulated into hallway 150 feet with RW and supervision.  Verbal cues for gait pattern with RW to off load LLE.  Pt continued to stairs , PT instructed on use of railing and a cane to navigate stairs.  Pt able to demonstrate navigating 8 stairs with supervision and verbal cues.  Pt then ambulated 200 feet with RW and supervision.  Pt positioned in chair at end of session with lunch in front and all needs in reach.  A: Tolerated well.  Expect pt to progress well with rehab.   P; Cont. as per plan.   Equipment Use RW   End of Consult   Patient Position at End of Consult Bedside chair;All needs within reach   Licensure   [place] License Number  Estela Carlson PT  [id number]

## 2024-06-28 NOTE — CASE MANAGEMENT
Case Management Discharge Planning Note    Patient name Liliana Zheng  Location [address] MRN [id number]  : 1960 Date 2024       Current Admission Date: 2024  Current Admission Diagnosis:Left displaced femoral neck fracture (HCC)   Patient Active Problem List    Diagnosis Date Noted Date Diagnosed    Left displaced femoral neck fracture (HCC) 2024     Age-related osteoporosis with current pathological fracture with malunion 2024     Fall 2024       LOS (days): 4  Geometric Mean LOS (GMLOS) (days): 1.7  Days to GMLOS:-2.1     OBJECTIVE:  Risk of Unplanned Readmission Score: 9.8         Current admission status: Inpatient   Preferred Pharmacy:   [id number] - [place], [place] - [address]  [address]  [facility] [address]  Phone: [phone] Fax: [phone]    Primary Care Provider: No primary care provider on file.    Primary Insurance: BLUE CROSS  Secondary Insurance:     DISCHARGE DETAILS:      CM made Olu Horta aware of BSC and wheeled walker that needs to be delivered to patient bedside for discharge. Olu Horta delivered DME to patient bedside.

## 2024-06-28 NOTE — NURSING NOTE
Pt discharged from [address] [place] [address]. IV removed prior to discharge. Pt left with all their belongings. Pt left via wheelchair accompanied by LPN. Discharge instructions gone over with patient. No prescriptions written. Prescription sent over electronically to pt's pharmacy. All questions answered.

## 2024-06-28 NOTE — DISCHARGE INSTR - AVS FIRST PAGE
Follow ups:  -Orthopedics (Dr. Cueva) - 2 weeks from surgical day  - Dressing may be removed at 7 days postop - dressing and incision must stay dry at all times.      New prescriptions:  -Aspirin 325mg twice daily x 6 weeks  -VitD/Calcium supplementation    Other instructions:  -Outpatient PT/OT (referral made)  -Walker, cane, commode will be sent to your house   -Surgical dressing may remain in place x 7 days

## 2024-06-28 NOTE — ASSESSMENT & PLAN NOTE
Presented to the ED with left lower extremity pain status post multiple falls  X-ray left tibia-fibula and left femur demonstrated acute subcapital left hip fracture  CT pelvis revealed comminuted and displaced fracture of the left femoral neck  Pain  management  Consult Orthopedics, recommendations are appreciated  S/p L hip arthroplasty on 6/27/24  ASA 325mg BID x 6 weeks  for DVT prophylaxis  PT/OT- home with outpatient PT/OT

## 2024-06-28 NOTE — PLAN OF CARE
Problem: PAIN - ADULT  Goal: Verbalizes/displays adequate comfort level or baseline comfort level  Description: Interventions:  - Encourage patient to monitor pain and request assistance  - Assess pain using appropriate pain scale  - Administer analgesics based on type and severity of pain and evaluate response  - Implement non-pharmacological measures as appropriate and evaluate response  - Consider cultural and social influences on pain and pain management  - Notify physician/advanced practitioner if interventions unsuccessful or patient reports new pain  Outcome: Progressing     Problem: INFECTION - ADULT  Goal: Absence or prevention of progression during hospitalization  Description: INTERVENTIONS:  - Assess and monitor for signs and symptoms of infection  - Monitor lab/diagnostic results  - Monitor all insertion sites, i.e. indwelling lines, tubes, and drains  - Administer medications as ordered  - Instruct and encourage patient and family to use good hand hygiene technique  - Identify and instruct in appropriate isolation precautions for identified infection/condition  Outcome: Progressing  Goal: Absence of fever/infection during neutropenic period  Description: INTERVENTIONS:  - Monitor WBC    Outcome: Progressing     Problem: SAFETY ADULT  Goal: Patient will remain free of falls  Description: INTERVENTIONS:  - Educate patient/family on patient safety including physical limitations  - Instruct patient to call for assistance with activity   - Consult OT/PT to assist with strengthening/mobility   - Keep Call bell within reach  - Keep bed low and locked with side rails adjusted as appropriate  - Keep care items and personal belongings within reach  - Initiate and maintain comfort rounds  - Make Fall Risk Sign visible to staff  - Offer Toileting every 2 Hours, in advance of need  - Initiate/Maintain bed alarm  - Obtain necessary fall risk management equipment: bed alarm  - Apply yellow socks and bracelet for  high fall risk patients  - Consider moving patient to room near nurses station  Outcome: Progressing  Goal: Maintain or return to baseline ADL function  Description: INTERVENTIONS:  -  Assess patient's ability to carry out ADLs; assess patient's baseline for ADL function and identify physical deficits which impact ability to perform ADLs (bathing, care of mouth/teeth, toileting, grooming, dressing, etc.)  - Assess/evaluate cause of self-care deficits   - Assess range of motion  - Assess patient's mobility; develop plan if impaired  - Assess patient's need for assistive devices and provide as appropriate  - Encourage maximum independence but intervene and supervise when necessary  - Involve family in performance of ADLs  - Assess for home care needs following discharge   - Consider OT consult to assist with ADL evaluation and planning for discharge  - Provide patient education as appropriate  Outcome: Progressing  Goal: Maintains/Returns to pre admission functional level  Description: INTERVENTIONS:  - Perform AM-PAC 6 Click Basic Mobility/ Daily Activity assessment daily.  - Set and communicate daily mobility goal to care team and patient/family/caregiver.   - Collaborate with rehabilitation services on mobility goals if consulted  - Perform Range of Motion 3 times a day.  - Reposition patient every 2 hours.  - Record patient progress and toleration of activity level   Outcome: Progressing     Problem: DISCHARGE PLANNING  Goal: Discharge to home or other facility with appropriate resources  Description: INTERVENTIONS:  - Identify barriers to discharge w/patient and caregiver  - Arrange for needed discharge resources and transportation as appropriate  - Identify discharge learning needs (meds, wound care, etc.)  - Refer to Case Management Department for coordinating discharge planning if the patient needs post-hospital services based on physician/advanced practitioner order or complex needs related to functional  status, cognitive ability, or social support system  Outcome: Progressing     Problem: Knowledge Deficit  Goal: Patient/family/caregiver demonstrates understanding of disease process, treatment plan, medications, and discharge instructions  Description: Complete learning assessment and assess knowledge base.  Interventions:  - Provide teaching at level of understanding  - Provide teaching via preferred learning methods  Outcome: Progressing     Problem: Prexisting or High Potential for Compromised Skin Integrity  Goal: Skin integrity is maintained or improved  Description: INTERVENTIONS:  - Identify patients at risk for skin breakdown  - Assess and monitor skin integrity  - Assess and monitor nutrition and hydration status  - Monitor labs   - Assess for incontinence   - Turn and reposition patient  - Assist with mobility/ambulation  - Relieve pressure over bony prominences  - Avoid friction and shearing  - Provide appropriate hygiene as needed including keeping skin clean and dry  - Evaluate need for skin moisturizer/barrier cream  - Collaborate with interdisciplinary team   - Patient/family teaching  - Consider wound care consult   Outcome: Progressing     Problem: MUSCULOSKELETAL - ADULT  Goal: Maintain or return mobility to safest level of function  Description: INTERVENTIONS:  - Assess patient's ability to carry out ADLs; assess patient's baseline for ADL function and identify physical deficits which impact ability to perform ADLs (bathing, care of mouth/teeth, toileting, grooming, dressing, etc.)  - Assess/evaluate cause of self-care deficits   - Assess range of motion  - Assess patient's mobility  - Assess patient's need for assistive devices and provide as appropriate  - Encourage maximum independence but intervene and supervise when necessary  - Involve family in performance of ADLs  - Assess for home care needs following discharge   - Consider OT consult to assist with ADL evaluation and planning for  discharge  - Provide patient education as appropriate  Outcome: Progressing  Goal: Maintain proper alignment of affected body part  Description: INTERVENTIONS:  - Support, maintain and protect limb and body alignment  - Provide patient/ family with appropriate education  Outcome: Progressing

## 2024-06-28 NOTE — DISCHARGE SUMMARY
[facility]  Renaldo- Liliana Zheng 1960, 63 y.o. female MRN: [id number]  Unit/Bed#: 2 [address] 202 Encounter: [id number]  Primary Care Provider: No primary care provider on file.   Date and time admitted to hospital: 6/24/2024  4:43 PM    * Left displaced femoral neck fracture (HCC)  Assessment & Plan  Presented to the ED with left lower extremity pain status post multiple falls  X-ray left tibia-fibula and left femur demonstrated acute subcapital left hip fracture  CT pelvis revealed comminuted and displaced fracture of the left femoral neck  Pain  management  Consult Orthopedics, recommendations are appreciated  S/p L hip arthroplasty on 6/27/24  ASA 325mg BID x 6 weeks  for DVT prophylaxis  PT/OT- home with outpatient PT/OT    Fall  Assessment & Plan  Fall from standing upright  CT head with no intracranial hemorrhage or calvarial fracture  Fall precautions    Age-related osteoporosis with current pathological fracture with malunion  Assessment & Plan  Fragility fracture, this was a ground-level fall   Vitamin D 33.9  Start vitamin D with calcium      Medical Problems       Resolved Problems  Date Reviewed: 6/28/2024   None       Discharging Physician / Practitioner: Melissa Tavarez PA-C  PCP: No primary care provider on file.  Admission Date:   Admission Orders (From admission, onward)       Ordered        06/24/24 1912  INPATIENT ADMISSION  Once                          Discharge Date: 06/28/24    Consultations During Hospital Stay:  Ortho     Procedures Performed:   L hip arthroplasty (6/27/24)    Significant Findings / Test Results:   CT head (6/24/24): No intracranial hemorrhage or calvarial fracture.   CT pelvis (6/24/24): Comminuted and displaced fracture of the left femoral neck.   XR L femur (6/24/24): Acute subcapital left hip fracture.   XR L tibia/fibula (6/24/24): Acute subcapital left hip fracture.   XR L hip/pelvis (6/27/24): The patient is status post left total hip  "arthroplasty. The components appear well seated with no periprosthetic fracture seen. Cerclage wire overlies the proximal left femur.     Incidental Findings:   None     Test Results Pending at Discharge (will require follow up):   None     Outpatient Tests Requested:  None    Complications:  None    Reason for Admission: Left displaced femoral neck fracture    Hospital Course:   Liliana Zheng is a 63 y.o. female patient who originally presented to the hospital on 6/24/2024 due to multiple falls in her home and left hip pain.  Patient was found to have comminuted and displaced fracture of the left femoral neck.  Orthopedics was consulted and patient underwent left hip arthroplasty on 6/27/2024.  Patient surgery was successful and PT/OT evaluated patient recommending that she is safe for discharge to home with outpatient physical therapy and Occupational Therapy.  Patient will need close follow-up with orthopedics as an outpatient.  All patient and family questions answered to the best of my ability.      Please see above list of diagnoses and related plan for additional information.     Condition at Discharge: stable    Discharge Day Visit / Exam:   Subjective:  patient denies any post-op pain. Did well with PT/OT today. Eager for discharge.     Vitals: Blood Pressure: 147/84 (06/28/24 0616)  Pulse: 71 (06/28/24 0616)  Temperature: 98 °F (36.7 °C) (06/28/24 0616)  Temp Source: Oral (06/28/24 0616)  Respirations: 16 (06/28/24 0616)  Height: 5' 8\" (172.7 cm) (06/24/24 2037)  Weight - Scale: 66.8 kg (147 lb 4.8 oz) (06/24/24 2037)  SpO2: 99 % (06/28/24 0616)  Exam:   Physical Exam  Vitals and nursing note reviewed.   Constitutional:       General: She is not in acute distress.     Appearance: She is well-developed.   HENT:      Head: Normocephalic and atraumatic.   Eyes:      Conjunctiva/sclera: Conjunctivae normal.   Cardiovascular:      Rate and Rhythm: Normal rate and regular rhythm.      Heart sounds: No murmur " heard.  Pulmonary:      Effort: Pulmonary effort is normal. No respiratory distress.      Breath sounds: Normal breath sounds.   Abdominal:      Palpations: Abdomen is soft.      Tenderness: There is no abdominal tenderness.   Musculoskeletal:         General: No swelling.      Cervical back: Neck supple.      Comments: S/p L hip arthroplasty. Site is clean, dry, intact   Skin:     General: Skin is warm and dry.      Capillary Refill: Capillary refill takes less than 2 seconds.   Neurological:      Mental Status: She is alert.   Psychiatric:         Mood and Affect: Mood normal.          Discussion with Family: Attempted to update  () via phone. Left voicemail.     Discharge instructions/Information to patient and family:   See after visit summary for information provided to patient and family.      Provisions for Follow-Up Care:  See after visit summary for information related to follow-up care and any pertinent home health orders.      Mobility at time of Discharge:   Basic Mobility Inpatient Raw Score: 9  JH-HLM Goal: 3: Sit at edge of bed  JH-HLM Achieved: 4: Move to chair/commode  HLM Goal NOT achieved. Continue to encourage mobility in post discharge setting.     Disposition:   Home    Planned Readmission: None     Discharge Statement:  I spent 55 minutes discharging the patient. This time was spent on the day of discharge. I had direct contact with the patient on the day of discharge. Greater than 50% of the total time was spent examining patient, answering all patient questions, arranging and discussing plan of care with patient as well as directly providing post-discharge instructions.  Additional time then spent on discharge activities.    Discharge Medications:  See after visit summary for reconciled discharge medications provided to patient and/or family.      **Please Note: This note may have been constructed using a voice recognition system**

## 2024-06-28 NOTE — PLAN OF CARE
Problem: OCCUPATIONAL THERAPY ADULT  Goal: Performs self-care activities at highest level of function for planned discharge setting.  See evaluation for individualized goals.  Description: Treatment Interventions: ADL retraining, Functional transfer training, Endurance training, Patient/family training, Equipment evaluation/education, Compensatory technique education, Energy conservation, Continued evaluation, Activityengagement          See flowsheet documentation for full assessment, interventions and recommendations.   Note: Limitation: Decreased ADL status, Decreased endurance, Decreased high-level ADLs, Decreased self-care trans (impaired activity tolerance, standing tolerance, L LE ROM /strength, balance)     Assessment: Pt is a 64yo female admitted to [facility] on 6/24/24 from home following multiple falls. Diagnosed w/ left displaced femoral neck fracture and is s/p L ant CATHIE w/ Dr. Cueva on 6/27/24. Per orthopedics, pt is WBAT L LE. Pt reports living w/ and their dog in multi level home 2nd floor bathroom/ bedroom and 2 NELSON. Pt reports I w/ ADL/ IADL at baseline w/ out use of AD or DME. Pt was using cane vs crutches since initial fall. No significant PMH impacting her occupational performance. Upon eval, pt alert and oriented. Able to follow directions and communicate wants / needs w/ increased time. Pt demonstrated B UE AROM / strength WFL to complete ADLs. Pt required mod I grooming seated, mod I UBD, min A LBD, min A bed mobility, min A sit <> stand and min A using RW for short distance functional mobility. Pt is completing ADLs below baseline level of I w/ increased pain, anticipated L LE ROM / strength deficits, impaired standing tolerance / balance, forward functional reach. Pt would benefit from OT in acute care to max I w/ ADLs and improve engagement. Recommend level III rehab resource intensity when medically stable. Will continue to follow     Rehab Resource Intensity Level, OT: III (Minimum  Resource Intensity)

## 2024-06-28 NOTE — CASE MANAGEMENT
Case Management Discharge Planning Note    Patient name Liliana Zheng  Location 2 [address] 202/2 [facility] 202 MRN [id number]  : 1960 Date 2024       Current Admission Date: 2024  Current Admission Diagnosis:Left displaced femoral neck fracture (HCC)   Patient Active Problem List    Diagnosis Date Noted Date Diagnosed    Left displaced femoral neck fracture (HCC) 2024     Age-related osteoporosis with current pathological fracture with malunion 2024     Fall 2024       LOS (days): 4  Geometric Mean LOS (GMLOS) (days):   Days to GMLOS:     OBJECTIVE:  Risk of Unplanned Readmission Score: 9.55         Current admission status: Inpatient   Preferred Pharmacy:   [id number] - [place], [place] - [address]  [address]  [facility] [id number]  Phone: [phone] Fax: [phone]    Primary Care Provider: No primary care provider on file.    Primary Insurance: BLUE CROSS  Secondary Insurance:     DISCHARGE DETAILS:      DME Referral Provided  Referral made for DME?: Yes  DME referral completed for the following items:: Bedside Commode, Walker  [facility]    Other Referral/Resources/Interventions Provided:  Referral Comments: CM ordered BSC and wheeled walker in [place].    Treatment Team Recommendation: Home  Discharge Destination Plan:: Home  Transport at Discharge : Family    ETA of Transport (Date): 24        Transfer Mode: Caregiver

## 2024-06-28 NOTE — PLAN OF CARE
Problem: PAIN - ADULT  Goal: Verbalizes/displays adequate comfort level or baseline comfort level  Description: Interventions:  - Encourage patient to monitor pain and request assistance  - Assess pain using appropriate pain scale  - Administer analgesics based on type and severity of pain and evaluate response  - Implement non-pharmacological measures as appropriate and evaluate response  - Consider cultural and social influences on pain and pain management  - Notify physician/advanced practitioner if interventions unsuccessful or patient reports new pain  6/28/2024 1751 by Katherine Rodriguez Reyes, RN  Outcome: Adequate for Discharge  6/28/2024 0910 by Katherine Rodriguez Reyes, RN  Outcome: Progressing     Problem: INFECTION - ADULT  Goal: Absence or prevention of progression during hospitalization  Description: INTERVENTIONS:  - Assess and monitor for signs and symptoms of infection  - Monitor lab/diagnostic results  - Monitor all insertion sites, i.e. indwelling lines, tubes, and drains  - Administer medications as ordered  - Instruct and encourage patient and family to use good hand hygiene technique  - Identify and instruct in appropriate isolation precautions for identified infection/condition  6/28/2024 1751 by Katherine Rodriguez Reyes, RN  Outcome: Adequate for Discharge  6/28/2024 0910 by Katherine Rodriguez Reyes, RN  Outcome: Progressing  Goal: Absence of fever/infection during neutropenic period  Description: INTERVENTIONS:  - Monitor WBC    6/28/2024 1751 by Katherine Rodriguez Reyes, RN  Outcome: Adequate for Discharge  6/28/2024 0910 by Katherine Rodriguez Reyes, RN  Outcome: Progressing     Problem: SAFETY ADULT  Goal: Patient will remain free of falls  Description: INTERVENTIONS:  - Educate patient/family on patient safety including physical limitations  - Instruct patient to call for assistance with activity   - Consult OT/PT to assist with strengthening/mobility   - Keep Call bell within  reach  - Keep bed low and locked with side rails adjusted as appropriate  - Keep care items and personal belongings within reach  - Initiate and maintain comfort rounds  - Make Fall Risk Sign visible to staff  - Offer Toileting every 2 Hours, in advance of need  - Initiate/Maintain bed alarm  - Obtain necessary fall risk management equipment: bed alarm  - Apply yellow socks and bracelet for high fall risk patients  - Consider moving patient to room near nurses station  6/28/2024 1751 by Katherine Rodriguez Reyes, RN  Outcome: Adequate for Discharge  6/28/2024 0910 by Katherine Rodriguez Reyes, RN  Outcome: Progressing  Goal: Maintain or return to baseline ADL function  Description: INTERVENTIONS:  -  Assess patient's ability to carry out ADLs; assess patient's baseline for ADL function and identify physical deficits which impact ability to perform ADLs (bathing, care of mouth/teeth, toileting, grooming, dressing, etc.)  - Assess/evaluate cause of self-care deficits   - Assess range of motion  - Assess patient's mobility; develop plan if impaired  - Assess patient's need for assistive devices and provide as appropriate  - Encourage maximum independence but intervene and supervise when necessary  - Involve family in performance of ADLs  - Assess for home care needs following discharge   - Consider OT consult to assist with ADL evaluation and planning for discharge  - Provide patient education as appropriate  6/28/2024 1751 by Katherine Rodriguez Reyes, RN  Outcome: Adequate for Discharge  6/28/2024 0910 by Katherine Rodriguez Reyes, RN  Outcome: Progressing  Goal: Maintains/Returns to pre admission functional level  Description: INTERVENTIONS:  - Perform AM-PAC 6 Click Basic Mobility/ Daily Activity assessment daily.  - Set and communicate daily mobility goal to care team and patient/family/caregiver.   - Collaborate with rehabilitation services on mobility goals if consulted  - Perform Range of Motion 3 times a  day.  - Reposition patient every 2 hours.  - Record patient progress and toleration of activity level   6/28/2024 1751 by Katherine Rodriguez Reyes, RN  Outcome: Adequate for Discharge  6/28/2024 0910 by Katherine Rodriguez Reyes, RN  Outcome: Progressing     Problem: DISCHARGE PLANNING  Goal: Discharge to home or other facility with appropriate resources  Description: INTERVENTIONS:  - Identify barriers to discharge w/patient and caregiver  - Arrange for needed discharge resources and transportation as appropriate  - Identify discharge learning needs (meds, wound care, etc.)  - Refer to Case Management Department for coordinating discharge planning if the patient needs post-hospital services based on physician/advanced practitioner order or complex needs related to functional status, cognitive ability, or social support system  6/28/2024 1751 by Katherine Rodriguez Reyes, RN  Outcome: Adequate for Discharge  6/28/2024 0910 by Katherine Rodriguez Reyes, RN  Outcome: Progressing     Problem: Knowledge Deficit  Goal: Patient/family/caregiver demonstrates understanding of disease process, treatment plan, medications, and discharge instructions  Description: Complete learning assessment and assess knowledge base.  Interventions:  - Provide teaching at level of understanding  - Provide teaching via preferred learning methods  6/28/2024 1751 by Katherine Rodriguez Reyes, RN  Outcome: Adequate for Discharge  6/28/2024 0910 by Katherine Rodriguez Reyes, RN  Outcome: Progressing     Problem: Prexisting or High Potential for Compromised Skin Integrity  Goal: Skin integrity is maintained or improved  Description: INTERVENTIONS:  - Identify patients at risk for skin breakdown  - Assess and monitor skin integrity  - Assess and monitor nutrition and hydration status  - Monitor labs   - Assess for incontinence   - Turn and reposition patient  - Assist with mobility/ambulation  - Relieve pressure over bony prominences  - Avoid  friction and shearing  - Provide appropriate hygiene as needed including keeping skin clean and dry  - Evaluate need for skin moisturizer/barrier cream  - Collaborate with interdisciplinary team   - Patient/family teaching  - Consider wound care consult   6/28/2024 1751 by Katherine Rodriguez Reyes, RN  Outcome: Adequate for Discharge  6/28/2024 0910 by Katherine Rodriguez Reyes, RN  Outcome: Progressing     Problem: MUSCULOSKELETAL - ADULT  Goal: Maintain or return mobility to safest level of function  Description: INTERVENTIONS:  - Assess patient's ability to carry out ADLs; assess patient's baseline for ADL function and identify physical deficits which impact ability to perform ADLs (bathing, care of mouth/teeth, toileting, grooming, dressing, etc.)  - Assess/evaluate cause of self-care deficits   - Assess range of motion  - Assess patient's mobility  - Assess patient's need for assistive devices and provide as appropriate  - Encourage maximum independence but intervene and supervise when necessary  - Involve family in performance of ADLs  - Assess for home care needs following discharge   - Consider OT consult to assist with ADL evaluation and planning for discharge  - Provide patient education as appropriate  6/28/2024 1751 by Katherine Rodriguez Reyes, RN  Outcome: Adequate for Discharge  6/28/2024 0910 by Katherine Rodriguez Reyes, RN  Outcome: Progressing  Goal: Maintain proper alignment of affected body part  Description: INTERVENTIONS:  - Support, maintain and protect limb and body alignment  - Provide patient/ family with appropriate education  6/28/2024 1751 by Katherine Rodriguez Reyes, RN  Outcome: Adequate for Discharge  6/28/2024 0910 by Katherine Rodriguez Reyes, RN  Outcome: Progressing

## 2024-07-02 PROCEDURE — 88305 TISSUE EXAM BY PATHOLOGIST: CPT | Performed by: PATHOLOGY

## 2024-07-02 PROCEDURE — 88311 DECALCIFY TISSUE: CPT | Performed by: PATHOLOGY

## 2024-07-10 ENCOUNTER — EVALUATION (OUTPATIENT)
Dept: PHYSICAL THERAPY | Facility: CLINIC | Age: 64
End: 2024-07-10
Payer: COMMERCIAL

## 2024-07-10 DIAGNOSIS — S72.002A LEFT DISPLACED FEMORAL NECK FRACTURE (HCC): ICD-10-CM

## 2024-07-10 PROCEDURE — 97161 PT EVAL LOW COMPLEX 20 MIN: CPT | Performed by: PHYSICAL MEDICINE & REHABILITATION

## 2024-07-10 NOTE — PROGRESS NOTES
PT Evaluation     Today's date: 7/10/2024  Patient name: Liliana Zheng  : 1960  MRN: [id number]  Referring provider: Melissa Tavarez PA-C  Dx:   Encounter Diagnosis     ICD-10-CM    1. Left displaced femoral neck fracture (HCC)  S72.002A Ambulatory referral to Physical Therapy     PT plan of care cert/re-cert          Start Time: 1745  Stop Time: 1825  Total time in clinic (min): 40 minutes    Assessment  Impairments: abnormal gait, abnormal or restricted ROM, activity intolerance, impaired physical strength, lacks appropriate home exercise program, pain with function and weight-bearing intolerance  Symptom irritability: moderate    Assessment details: Liliana Zheng is an 63 y.o. female  arriving to clinic status post left CATHIE performed on 2024. Patient's wound not assessed at this time as bandage still donned. Patient arrived at clinic independently ambulating with RW and step through pattern. Hip range of motion and strength at this time. Pain managed with medication and self applied ice. Functionally patient is limited with ADL's, recreational activities, work-related activities, engaging in social activities, ambulation, stair negotiation, lifting/carrying, transfers. Patient's TUG score measured at 21 indicating patient is at increased risk for falls. Patient lives with  who is available for support. Distal LE strength intact. Patient has been educated in post-op contraindications / precautions, weight bearing status, home exercise program, and plan of care. Patient would benefit from skilled physical therapy services to address their aforementioned functional limitations and progress towards prior level of function and independence with home exercise program.      Goals  Short term goals: 4 weeks from surgery  1. Improve walking tolerance to 15 minutes to perform household activity  2. Patient to be able to negotiate 5 stairs with pain 3/10 pain or less  3. Patient will be able to  perform sit to stand transfers from low chair without increased pain  4. Patient to reduce pain to 4/10 at its worst to improve activity tolerance    Long term goals: 12 weeks from surgery  1. Improve walking tolerance to 30 minutes   2. Patient to improve hip range of motion to normal limits  3. Patient to improve proximal hip strength to 4+/5   4. Patient to reduce pain to 1/10 at its worst to improve QOL and return to function      Plan  Patient would benefit from: skilled physical therapy  Planned modality interventions: TENS, unattended electrical stimulation, thermotherapy: hydrocollator packs and cryotherapy    Planned therapy interventions: abdominal trunk stabilization, flexibility, functional ROM exercises, home exercise program, therapeutic exercise, therapeutic activities, stretching, strengthening, self care, postural training, patient education, neuromuscular re-education, massage, manual therapy and joint mobilization    Frequency: 2x week  Duration in weeks: 12  Treatment plan discussed with: patient    Subjective Evaluation    History of Present Illness  Date of surgery: 6/27/2024  Mechanism of injury: surgery  Mechanism of injury: Patient reports two weeks ago she got bumped by her dog resulting in a fall onto her left side noting she did have pain in her hip however notes that she carried on for 2 days including a full day at work. Patient notes that pain persisted therefore her  called the ambulance where she was taken to the hospital on June 24. It was discovered patient had a L hip Fx. Patient underwent L CATHIE via anterior approach on June 27, 2024. Patient was sent home from the hospital was referred to OPPT. At this time patient notes pain is managed well. Patient notes she is managing well at home with stairs and function around the home. Patient states she has been primarily ambulating with a RW.          Recurrent probem    Patient Goals  Patient goals for therapy: decreased pain,  increased strength, increased motion and return to work  Patient goal: Make left leg as strong as right leg  Pain  Current pain ratin  At best pain ratin  At worst pain ratin  Location: L hip  Quality: tight, discomfort and dull ache  Relieving factors: ice  Exacerbated by: Doing too much.  Progression: worsening    Social Support  Steps to enter house: yes  Stairs in house: yes   Lives with: spouse      Diagnostic Tests  CT scan: abnormal  Treatments  No previous or current treatments    Objective      MMT:    Right  Left    Hip Flexion:   4+/5  3/5  Hip Abduction:   4+/5  3/5  Hip Adduction:   5/5  4/5  Hip Extension:   NT/5  NT/5  Knee Flexion:   5/5  4/5  Knee Extension:  /5  4/5  Ankle Dorsiflexion:  5  5/5  Ankle Plantarflexion:  /  5/5    AROM:   Right  Left    Hip Flexion:   120  100  Hip Internal Rotation:  20  5  Hip External Rotation:  40  20  Hip Abduction:   35  20    SENSATION:  Intact to light touch    GAIT:  Patient ambulates with step through pattern using a RW    TU seconds with RW    5xSTS:  14 seconds    SPECIAL TESTS:  (-) hoffmans     Incision:   Covered         Precautions: History reviewed. No pertinent past medical history.    DOS: 2024  L hip CATHIE anterior approach following Fx      Date: 07/10/2024       Visit # 1 EVAL       Manuals        Gentle stretching                                Neuro Re-Ed        LAQ        BKFO                                                Ther Ex        NuStep        STS        Heel raises        Standing hip abduction/ext        Adduction ball squeeze                                        Ther Activity                        Gait Training                        Modalities                          Access Code: [email or web address]  URL: [email or web address]  Date: 07/10/2024  Prepared by: Abebe Arellano    Exercises  - Seated Long Arc Quad  - 1 x daily - 7 x weekly - 2 sets - 10 reps  - Seated Hip Adduction Isometrics with  Ball  - 1 x daily - 7 x weekly - 2 sets - 10 reps  - Heel Raises with Counter Support  - 1 x daily - 7 x weekly - 2 sets - 10 reps  - Standing Hip Abduction with Counter Support  - 1 x daily - 7 x weekly - 2 sets - 10 reps  - Standing Knee Flexion with Counter Support  - 1 x daily - 7 x weekly - 2 sets - 10 reps

## 2024-07-11 ENCOUNTER — OFFICE VISIT (OUTPATIENT)
Dept: OBGYN CLINIC | Facility: CLINIC | Age: 64
End: 2024-07-11

## 2024-07-11 ENCOUNTER — APPOINTMENT (OUTPATIENT)
Dept: RADIOLOGY | Facility: CLINIC | Age: 64
End: 2024-07-11
Payer: COMMERCIAL

## 2024-07-11 VITALS — WEIGHT: 147 LBS | BODY MASS INDEX: 22.28 KG/M2 | HEIGHT: 68 IN

## 2024-07-11 DIAGNOSIS — S72.002A DISPLACED FRACTURE OF LEFT FEMORAL NECK (HCC): ICD-10-CM

## 2024-07-11 DIAGNOSIS — Z96.642 STATUS POST TOTAL REPLACEMENT OF LEFT HIP: Primary | ICD-10-CM

## 2024-07-11 DIAGNOSIS — S72.002A LEFT DISPLACED FEMORAL NECK FRACTURE (HCC): ICD-10-CM

## 2024-07-11 PROCEDURE — 99024 POSTOP FOLLOW-UP VISIT: CPT | Performed by: ORTHOPAEDIC SURGERY

## 2024-07-11 PROCEDURE — 73502 X-RAY EXAM HIP UNI 2-3 VIEWS: CPT

## 2024-07-11 NOTE — PROGRESS NOTES
Assessment/Plan:  1. Left displaced femoral neck fracture (HCC)  XR hip/pelv 2-3 vws left if performed      2. Displaced fracture of left femoral neck (HCC)  Ambulatory Referral to Orthopedic Surgery    XR hip/pelv 2-3 vws left if performed      3. Status post total replacement of left hip          Scribe Attestation      I,:  Ronda Smith MD am acting as a scribe while in the presence of the attending physician.:       I,:  Twan Cueva,  personally performed the services described in this documentation    as scribed in my presence.:           Liliana is a pleasant 63 year old female here for follow up for left hip now 2 weeks status post left total hip arthroplasty for femoral neck fracture. She is doing well and ambulating with a walker. Her incision is well appearing. Discussed she may shower normally now but no submerging in water at this time. Will re-evaluate when she is cleared to go back to work at next visit in 4 weeks with repeat left hip XR.    Subjective: 2 weeks status post left total hip arthroplasty for femoral neck fracture    Patient ID: Liliana Zheng is a 63 y.o. female who presents today for  follow up of left hip now 2 weeks status post left total hip arthroplasty for displaced femoral neck fracture sustained 3 weeks prior to surgery. She has been doing well. She is ambulating with a walker. No questions or concerns. Has been working with outpatient PT. No fevers, chills, or drainage from her hip wound. Occasionally experiences muscle spasms in the left thigh but otherwise pain is well controlled.    Review of Systems   Constitutional:  Negative for chills and fever.   HENT:  Negative for ear pain and sore throat.    Eyes:  Negative for pain and visual disturbance.   Respiratory:  Negative for cough and shortness of breath.    Cardiovascular:  Negative for chest pain and palpitations.   Gastrointestinal:  Negative for abdominal pain and vomiting.   Genitourinary:  Negative for  dysuria and hematuria.   Musculoskeletal:  Positive for myalgias. Negative for arthralgias and back pain.   Skin:  Negative for color change and rash.   Neurological:  Negative for seizures and syncope.   All other systems reviewed and are negative.        History reviewed. No pertinent past medical history.    Past Surgical History:   Procedure Laterality Date    ARTHROPLASTY HIP TOTAL ANTERIOR Left 6/27/2024    Procedure: ARTHROPLASTY HIP TOTAL ANTERIOR;  Surgeon: Twan Cueva DO;  Location: [facility];  Service: Orthopedics       Family History   Problem Relation Age of Onset    Stroke Mother     Heart attack Father        Social History     Occupational History    Not on file   Tobacco Use    Smoking status: Never     Passive exposure: Never    Smokeless tobacco: Never   Vaping Use    Vaping status: Never Used   Substance and Sexual Activity    Alcohol use: Not Currently    Drug use: Not Currently    Sexual activity: Not on file         Current Outpatient Medications:     aspirin 325 mg tablet, Take 1 tablet (325 mg total) by mouth 2 (two) times a day, Disp: 84 tablet, Rfl: 0    Calcium Carb-Cholecalciferol (calcium carbonate-vitamin D) 500 mg-5 mcg tablet, Take 1 tablet by mouth daily with breakfast, Disp: 30 tablet, Rfl: 0    No Known Allergies    Objective:  There were no vitals filed for this visit.    Body mass index is 22.35 kg/m².    Left Hip Exam     Tenderness   The patient is experiencing no tenderness.     Range of Motion   Abduction:  40   Adduction:  20   Extension:  0   Flexion:  110   External rotation:  30   Internal rotation: 30     Muscle Strength   Abduction: 5/5   Adduction: 5/5   Flexion: 5/5     Tests   JOSE: negative    Other   Erythema: absent  Scars: present  Sensation: normal  Pulse: present    Comments:  Incision well appearing. No erythema. Well approximated. No warmth or drainage.            Physical Exam  Vitals and nursing note reviewed.   Constitutional:       General: She is  not in acute distress.     Appearance: Normal appearance.   HENT:      Head: Atraumatic.   Eyes:      Extraocular Movements: Extraocular movements intact.      Conjunctiva/sclera: Conjunctivae normal.   Cardiovascular:      Rate and Rhythm: Normal rate.      Pulses: Normal pulses.   Pulmonary:      Effort: Pulmonary effort is normal.   Musculoskeletal:      Comments: See Ortho Exam   Skin:     General: Skin is warm and dry.      Capillary Refill: Capillary refill takes less than 2 seconds.   Neurological:      Mental Status: She is alert and oriented to person, place, and time.   Psychiatric:         Mood and Affect: Mood normal.         I have personally reviewed pertinent films in PACS.    XR L hip demonstrates well aligned left total hip arthroplasty with cable without change in position or evidence of hardware failure.    This document was created using speech voice recognition software.   Grammatical errors, random word insertions, pronoun errors, and incomplete sentences are an occasional consequence of this system due to software limitations, ambient noise, and hardware issues.   Any formal questions or concerns about content, text, or information contained within the body of this dictation should be directly addressed to the provider for clarification.

## 2024-07-12 ENCOUNTER — TELEPHONE (OUTPATIENT)
Dept: OBGYN CLINIC | Facility: CLINIC | Age: 64
End: 2024-07-12

## 2024-07-15 ENCOUNTER — OFFICE VISIT (OUTPATIENT)
Dept: PHYSICAL THERAPY | Facility: CLINIC | Age: 64
End: 2024-07-15
Payer: COMMERCIAL

## 2024-07-15 DIAGNOSIS — S72.002A LEFT DISPLACED FEMORAL NECK FRACTURE (HCC): Primary | ICD-10-CM

## 2024-07-15 PROCEDURE — 97112 NEUROMUSCULAR REEDUCATION: CPT

## 2024-07-15 PROCEDURE — 97110 THERAPEUTIC EXERCISES: CPT

## 2024-07-15 NOTE — PROGRESS NOTES
"Daily Note     Today's date: 7/15/2024  Patient name: Liliana Zheng  : 1960  MRN: [id number]  Referring provider: Melissa Tavarez PA-C  Dx:   Encounter Diagnosis     ICD-10-CM    1. Left displaced femoral neck fracture (HCC)  S72.002A                      Subjective: I saw the Dr & he was pleased with my progress.      Objective: See treatment diary below      Assessment: Tolerated treatment well. Patient demonstrated fatigue post treatment, exhibited good technique with therapeutic exercises, and would benefit from continued PT  Patients  present during session.   Patient arrives ambulating using RW  Plan: Continue per plan of care.      Precautions: History reviewed. No pertinent past medical history.    DOS: 2024  L hip CATHIE anterior approach following Fx      Date: 07/10/2024 7/15/2024      Visit # 1 EVAL 2      Manuals        Gentle stretching  AD                              Neuro Re-Ed        LAQ  (L) 1# 2 x 10 reps      BKFO  10 x 5 sec                                               Ther Ex        NuStep  10 min L1      STS  19\" mat x 10 reps; 18\" mat x 10 reps       Heel raises  @ rail x 20 reps       Standing hip abduction/ext  Alt @ rail 10x each       Adduction ball squeeze  10x 5 sec hold                                       Ther Activity                        Gait Training  Using own SPC throughout session w/ supervision                      Modalities          CP L hip seated x 8 min                Access Code: [email or web address]  URL: [email or web address]  Date: 07/10/2024  Prepared by: Abebe Arellano    Exercises  - Seated Long Arc Quad  - 1 x daily - 7 x weekly - 2 sets - 10 reps  - Seated Hip Adduction Isometrics with Ball  - 1 x daily - 7 x weekly - 2 sets - 10 reps  - Heel Raises with Counter Support  - 1 x daily - 7 x weekly - 2 sets - 10 reps  - Standing Hip Abduction with Counter Support  - 1 x daily - 7 x weekly - 2 sets - 10 reps  - Standing Knee Flexion with " Counter Support  - 1 x daily - 7 x weekly - 2 sets - 10 reps

## 2024-07-17 ENCOUNTER — OFFICE VISIT (OUTPATIENT)
Dept: PHYSICAL THERAPY | Facility: CLINIC | Age: 64
End: 2024-07-17
Payer: COMMERCIAL

## 2024-07-17 DIAGNOSIS — S72.002A LEFT DISPLACED FEMORAL NECK FRACTURE (HCC): Primary | ICD-10-CM

## 2024-07-17 PROCEDURE — 97110 THERAPEUTIC EXERCISES: CPT

## 2024-07-17 PROCEDURE — 97112 NEUROMUSCULAR REEDUCATION: CPT

## 2024-07-17 NOTE — PROGRESS NOTES
"Daily Note     Today's date: 2024  Patient name: Liliana Zheng  : 1960  MRN: [id number]  Referring provider: Melissa Tavarez PA-C  Dx:   Encounter Diagnosis     ICD-10-CM    1. Left displaced femoral neck fracture (HCC)  S72.002A           Start Time: 1613          Subjective: I was actually able to do more around the house (put on socks & walk down the stairs) after you stretched my hip last time.       Objective: See treatment diary below      Assessment: Tolerated treatment well. Patient demonstrated fatigue post treatment, exhibited good technique with therapeutic exercises, and would benefit from continued PT  Patients  present during session.   Patient arrives ambulating using SPC.    Plan: Continue per plan of care.      Precautions: History reviewed. No pertinent past medical history.    DOS: 2024  L hip CATHIE anterior approach following Fx      Date: 07/10/2024 7/15/2024 2024     Visit # 1 EVAL 2 3     Manuals        Gentle stretching  AD AD                             Neuro Re-Ed        LAQ  (L) 1# 2 x 10 reps (L) 2# 2 x 10 reps     BKFO  10 x 5 sec  20/20 YTB                                             Ther Ex        NuStep  10 min L1 10 min L2     STS  19\" mat x 10 reps; 18\" mat x 10 reps  18\" mat 2 x 10 reps arms crossed     Heel raises  @ rail x 20 reps  20x (standing behind nustep) 2 UE support      Standing hip abduction/ext  Alt @ rail 10x each  Alt 20x each (standing behind nustep) 2 UE support     Adduction ball squeeze  10x 5 sec hold  5 sec hold x 2 minutes        SLR 2 x 10 reps                              Ther Activity                        Gait Training  Using own SPC throughout session w/ supervision ----                     Modalities          CP L hip seated x 8 min CP L hip seated x 8 min               Access Code: [email or web address]  URL: [email or web address]  Date: 07/10/2024  Prepared by: Abebe Arellano    Exercises  - Seated Long Arc Quad  - 1 " x daily - 7 x weekly - 2 sets - 10 reps  - Seated Hip Adduction Isometrics with Ball  - 1 x daily - 7 x weekly - 2 sets - 10 reps  - Heel Raises with Counter Support  - 1 x daily - 7 x weekly - 2 sets - 10 reps  - Standing Hip Abduction with Counter Support  - 1 x daily - 7 x weekly - 2 sets - 10 reps  - Standing Knee Flexion with Counter Support  - 1 x daily - 7 x weekly - 2 sets - 10 reps

## 2024-07-22 ENCOUNTER — OFFICE VISIT (OUTPATIENT)
Dept: PHYSICAL THERAPY | Facility: CLINIC | Age: 64
End: 2024-07-22
Payer: COMMERCIAL

## 2024-07-22 DIAGNOSIS — S72.002A LEFT DISPLACED FEMORAL NECK FRACTURE (HCC): Primary | ICD-10-CM

## 2024-07-22 PROCEDURE — 97112 NEUROMUSCULAR REEDUCATION: CPT

## 2024-07-22 PROCEDURE — 97110 THERAPEUTIC EXERCISES: CPT

## 2024-07-22 NOTE — PROGRESS NOTES
"Daily Note     Today's date: 2024  Patient name: Liliana Zheng  : 1960  MRN: [id number]  Referring provider: Melissa Tavarez PA-C  Dx:   Encounter Diagnosis     ICD-10-CM    1. Left displaced femoral neck fracture (HCC)  S72.002A                      Subjective: Patient purchased new sneakers with increased arch support, which she states was mentioned in previous sessions. She states it took a few days to get used to as she prefers flat soled shoes, but she notes an improvement overall when walking.      Objective: See treatment diary below. Ambulating with SPC.       Assessment: Tolerated treatment well. Hip mobility WNL in flexion and abduction. Progressed LE strengthening, which she tolerated well with no reported increase in pain. Redness on bilateral anterior knees from YTB during BKFO, she denies pain at site. She was educated to keep an eye on redness. Patient demonstrated fatigue post treatment, exhibited good technique with therapeutic exercises, and would benefit from continued PT to promote functional mobility with LRD.      Plan: Continue POC established by primary PT     Precautions: History reviewed. No pertinent past medical history.    DOS: 2024  L hip CATHIE anterior approach following Fx      Date: 07/10/2024 7/15/2024 2024 2024    Visit # 1 EVAL 2 3 4    Manuals        Gentle stretching  AD AD MR                            Neuro Re-Ed        LAQ  (L) 1# 2 x 10 reps (L) 2# 2 x 10 reps (L) 2# 3x10 reps     BKFO  10 x 5 sec  20/20 YTB 20/20 YTB     Bridge     Supine 2x6                                    Ther Ex        NuStep  10 min L1 10 min L2 L2 10 min     STS  19\" mat x 10 reps; 18\" mat x 10 reps  18\" mat 2 x 10 reps arms crossed 18\" mat 2 x 10 reps + 11lb ball     Heel raises  @ rail x 20 reps  20x (standing behind nustep) 2 UE support  20x (standing behind nustep) 2 UE support     Standing hip abduction/ext  Alt @ rail 10x each  Alt 20x each (standing behind " "nustep) 2 UE support 20x each (standing behind nustep) 2 UE support    Adduction ball squeeze  10x 5 sec hold  5 sec hold x 2 minutes 5 sec hold x 2 minutes       SLR 2 x 10 reps  SLR 2 x 10 reps     Lat walks     5 laps @ mirror     FSU/FSD    4\" 20x ea             Ther Activity                        Gait Training  Using own SPC throughout session w/ supervision ----                     Modalities          CP L hip seated x 8 min CP L hip seated x 8 min CP L hip seated x 8 min              Access Code: [email or web address]  URL: [email or web address]  Date: 07/10/2024  Prepared by: Abebe Arellano    Exercises  - Seated Long Arc Quad  - 1 x daily - 7 x weekly - 2 sets - 10 reps  - Seated Hip Adduction Isometrics with Ball  - 1 x daily - 7 x weekly - 2 sets - 10 reps  - Heel Raises with Counter Support  - 1 x daily - 7 x weekly - 2 sets - 10 reps  - Standing Hip Abduction with Counter Support  - 1 x daily - 7 x weekly - 2 sets - 10 reps  - Standing Knee Flexion with Counter Support  - 1 x daily - 7 x weekly - 2 sets - 10 reps             "

## 2024-07-24 ENCOUNTER — OFFICE VISIT (OUTPATIENT)
Dept: PHYSICAL THERAPY | Facility: CLINIC | Age: 64
End: 2024-07-24
Payer: COMMERCIAL

## 2024-07-24 DIAGNOSIS — S72.002A LEFT DISPLACED FEMORAL NECK FRACTURE (HCC): Primary | ICD-10-CM

## 2024-07-24 PROCEDURE — 97110 THERAPEUTIC EXERCISES: CPT

## 2024-07-24 PROCEDURE — 97112 NEUROMUSCULAR REEDUCATION: CPT

## 2024-07-24 NOTE — PROGRESS NOTES
"Daily Note     Today's date: 2024  Patient name: Liliana Zheng  : 1960  MRN: [id number]  Referring provider: Melissa Tavarez PA-C  Dx:   Encounter Diagnosis     ICD-10-CM    1. Left displaced femoral neck fracture (HCC)  S72.002A                      Subjective: Not really using the cane around the house.      Objective: See treatment diary below      Assessment: Tolerated treatment well. Patient demonstrated fatigue post treatment, exhibited good technique with therapeutic exercises, and would benefit from continued PT  Patient arrives ambulating using SPC  Patients  present during session.     Plan: Continue per plan of care.      Precautions: History reviewed. No pertinent past medical history.    DOS: 2024  L hip CATHIE anterior approach following Fx      Date: 07/10/2024 7/15/2024 2024 2024 2024   Visit # 1 EVAL 2 3 4 5   Manuals        Gentle stretching  AD AD MR AD                           Neuro Re-Ed        LAQ  (L) 1# 2 x 10 reps (L) 2# 2 x 10 reps (L) 2# 3x10 reps  (L) 2# 2 x 15 reps     BKFO  10 x 5 sec  20/20 YTB 20/20 YTB  20/20 YTB    Bridge     Supine 2x6 Supine 2 x 10 reps                 Standing marching on round foam 10 x 5 sec hold R UE support                   Ther Ex        NuStep  10 min L1 10 min L2 L2 10 min  Bike x 10 min L1 seat #6   STS  19\" mat x 10 reps; 18\" mat x 10 reps  18\" mat 2 x 10 reps arms crossed 18\" mat 2 x 10 reps + 11lb ball  18\" mat 2 x 10 reps + 11lb ball    Heel raises  @ rail x 20 reps  20x (standing behind nustep) 2 UE support  20x (standing behind nustep) 2 UE support  20x  on 1/2 roll (standing behind nustep) 2 UE support    Standing hip abduction/ext  Alt @ rail 10x each  Alt 20x each (standing behind nustep) 2 UE support 20x each (standing behind nustep) 2 UE support 20x each (standing behind nustep) 2 UE support - standing on round foam    Adduction ball squeeze  10x 5 sec hold  5 sec hold x 2 minutes 5 sec hold x 2 " "minutes 5 sec hold x 2 minutes      SLR 2 x 10 reps  SLR 2 x 10 reps  SLR 2 x 10 reps + 5 reps    Lat walks     5 laps @ mirror  ---   FSU/FSD    4\" 20x ea  6\" 10x each           Ther Activity                        Gait Training  Using own SPC throughout session w/ supervision ----                     Modalities          CP L hip seated x 8 min CP L hip seated x 8 min CP L hip seated x 8 min ----------             Access Code: [email or web address]  URL: [email or web address]  Date: 07/10/2024  Prepared by: Abebe Arellano    Exercises  - Seated Long Arc Quad  - 1 x daily - 7 x weekly - 2 sets - 10 reps  - Seated Hip Adduction Isometrics with Ball  - 1 x daily - 7 x weekly - 2 sets - 10 reps  - Heel Raises with Counter Support  - 1 x daily - 7 x weekly - 2 sets - 10 reps  - Standing Hip Abduction with Counter Support  - 1 x daily - 7 x weekly - 2 sets - 10 reps  - Standing Knee Flexion with Counter Support  - 1 x daily - 7 x weekly - 2 sets - 10 reps               "

## 2024-07-26 ENCOUNTER — TELEPHONE (OUTPATIENT)
Age: 64
End: 2024-07-26

## 2024-07-26 NOTE — TELEPHONE ENCOUNTER
"Caller: Patient    Doctor: Dr. Cueva    Reason for call: Patient received a \"request of leave c10 form.  She has some questions in regard to the form and would like to received call back from the leave team.     Call back#: [phone]    "

## 2024-07-29 ENCOUNTER — OFFICE VISIT (OUTPATIENT)
Dept: PHYSICAL THERAPY | Facility: CLINIC | Age: 64
End: 2024-07-29
Payer: COMMERCIAL

## 2024-07-29 DIAGNOSIS — S72.002A LEFT DISPLACED FEMORAL NECK FRACTURE (HCC): Primary | ICD-10-CM

## 2024-07-29 PROCEDURE — 97110 THERAPEUTIC EXERCISES: CPT

## 2024-07-29 PROCEDURE — 97112 NEUROMUSCULAR REEDUCATION: CPT

## 2024-07-29 NOTE — PROGRESS NOTES
"Daily Note     Today's date: 2024  Patient name: Liliana Zheng  : 1960  MRN: [id number]  Referring provider: Melissa Tavarez PA-C  Dx:   Encounter Diagnosis     ICD-10-CM    1. Left displaced femoral neck fracture (HCC)  S72.002A                      Subjective: My leg is feeling stronger.       Objective: See treatment diary below      Assessment: Tolerated treatment well. Patient demonstrated fatigue post treatment, exhibited good technique with therapeutic exercises, and would benefit from continued PT  Patient arrives ambulating using [facility]  Patients  present during session.     Plan: Continue per plan of care.      Precautions: History reviewed. No pertinent past medical history.    DOS: 2024  L hip CATHIE anterior approach following Fx      Date: 07/10/2024 7/15/2024 2024 2024 2024   Visit # 1 EVAL 2 3 4 6   Manuals        Gentle stretching  AD AD MR AD                           Neuro Re-Ed        LAQ  (L) 1# 2 x 10 reps (L) 2# 2 x 10 reps (L) 2# 3x10 reps  (L) 2#  20x    BKFO  10 x 5 sec  20/20 YTB 20/20 YTB  20/20 YTB    Bridge     Supine 2x6 Supine 2 x 10 reps w/ add squeeze                Standing marching on round foam 10 x 5 sec hold R UE support   2# (L)                   Ther Ex        NuStep  10 min L1 10 min L2 L2 10 min  Bike x 10 min L3 seat #6   STS  19\" mat x 10 reps; 18\" mat x 10 reps  18\" mat 2 x 10 reps arms crossed 18\" mat 2 x 10 reps + 11lb ball  18\" mat 2 x 10 reps + 11lb ball    Heel raises  @ rail x 20 reps  20x (standing behind nustep) 2 UE support  20x (standing behind nustep) 2 UE support  20x  on 1/2 roll (standing behind nustep) 2 UE support    Standing hip abduction/ext  Alt @ rail 10x each  Alt 20x each (standing behind nustep) 2 UE support 20x each (standing behind nustep) 2 UE support 20x each (standing behind nustep) 2 UE support - standing on round foam  2# (L)   Adduction ball squeeze  10x 5 sec hold  5 sec hold x 2 minutes 5 sec hold x 2 " "minutes ---------      SLR 2 x 10 reps  SLR 2 x 10 reps  SLR 2 x 10 reps    Lat walks     5 laps @ mirror  ---   FSU/FSD    4\" 20x ea  6\" 10x each           Ther Activity                        Gait Training  Using own SPC throughout session w/ supervision ----  Up/down 11 steps using 1 handrail x 2 reps                    Modalities          CP L hip seated x 8 min CP L hip seated x 8 min CP L hip seated x 8 min ----------             Access Code: [email or web address]  URL: [email or web address]  Date: 07/10/2024  Prepared by: Abebe Arellano    Exercises  - Seated Long Arc Quad  - 1 x daily - 7 x weekly - 2 sets - 10 reps  - Seated Hip Adduction Isometrics with Ball  - 1 x daily - 7 x weekly - 2 sets - 10 reps  - Heel Raises with Counter Support  - 1 x daily - 7 x weekly - 2 sets - 10 reps  - Standing Hip Abduction with Counter Support  - 1 x daily - 7 x weekly - 2 sets - 10 reps  - Standing Knee Flexion with Counter Support  - 1 x daily - 7 x weekly - 2 sets - 10 reps                 "

## 2024-07-29 NOTE — TELEPHONE ENCOUNTER
I spoke with the pt, she said she will call back if the dates need to be changed to the 25th on the forms.

## 2024-07-31 ENCOUNTER — OFFICE VISIT (OUTPATIENT)
Dept: PHYSICAL THERAPY | Facility: CLINIC | Age: 64
End: 2024-07-31
Payer: COMMERCIAL

## 2024-07-31 DIAGNOSIS — S72.002A LEFT DISPLACED FEMORAL NECK FRACTURE (HCC): Primary | ICD-10-CM

## 2024-07-31 PROCEDURE — 97112 NEUROMUSCULAR REEDUCATION: CPT

## 2024-07-31 PROCEDURE — 97110 THERAPEUTIC EXERCISES: CPT

## 2024-07-31 NOTE — PROGRESS NOTES
"Daily Note     Today's date: 2024  Patient name: Liliana Zheng  : 1960  MRN: [id number]  Referring provider: Melissa Tavarez PA-C  Dx:   Encounter Diagnosis     ICD-10-CM    1. Left displaced femoral neck fracture (HCC)  S72.002A                      Subjective: I've been trying not to use the cane & to go up/down the stairs normally.       Objective: See treatment diary below      Assessment: Tolerated treatment well. Patient demonstrated fatigue post treatment, exhibited good technique with therapeutic exercises, and would benefit from continued PT      Plan: Continue per plan of care.      Precautions: History reviewed. No pertinent past medical history.    DOS: 2024  L hip CATHIE anterior approach following Fx      Date: 07/10/2024 7/15/2024 2024 2024 2024   Visit # 1 EVAL 2 3 4 7   Manuals        Gentle stretching  AD AD MR AD                           Neuro Re-Ed        LAQ  (L) 1# 2 x 10 reps (L) 2# 2 x 10 reps (L) 2# 3x10 reps  (L) 3#  20x    BKFO  10 x 5 sec  20/20 YTB 20/20 YTB  20/20 RTB    Bridge     Supine 2x6 Supine 2 x 10 reps w/ add squeeze                Standing marching on round foam 10 x 5 sec hold R UE support   2# (L)                   Ther Ex        NuStep  10 min L1 10 min L2 L2 10 min  Bike x 10 min L3 seat #6   STS  19\" mat x 10 reps; 18\" mat x 10 reps  18\" mat 2 x 10 reps arms crossed 18\" mat 2 x 10 reps + 11lb ball  18\" mat 2 x 10 reps holding 10# TT   Heel raises  @ rail x 20 reps  20x (standing behind nustep) 2 UE support  20x (standing behind nustep) 2 UE support  20x  on 1/2 roll (standing behind nustep) 2 UE support    Standing hip abduction/ext  Alt @ rail 10x each  Alt 20x each (standing behind nustep) 2 UE support 20x each (standing behind nustep) 2 UE support 20x each (standing behind nustep) 2 UE support - standing on round foam  2# (L)   Adduction ball squeeze  10x 5 sec hold  5 sec hold x 2 minutes 5 sec hold x 2 minutes ---------      SLR 2 x " "10 reps  SLR 2 x 10 reps  SLR 2 x 10 reps  2# AK   Lat walks     5 laps @ mirror  ---   FSU/FSD    4\" 20x ea  6\" 10x each           Ther Activity                        Gait Training  Using own SPC throughout session w/ supervision ----  Up/down 11 steps using 1 handrail x 2 reps (reciprocally)                    Modalities          CP L hip seated x 8 min CP L hip seated x 8 min CP L hip seated x 8 min ----------             Access Code: [email or web address]  URL: [email or web address]  Date: 07/10/2024  Prepared by: Abebe Arellano    Exercises  - Seated Long Arc Quad  - 1 x daily - 7 x weekly - 2 sets - 10 reps  - Seated Hip Adduction Isometrics with Ball  - 1 x daily - 7 x weekly - 2 sets - 10 reps  - Heel Raises with Counter Support  - 1 x daily - 7 x weekly - 2 sets - 10 reps  - Standing Hip Abduction with Counter Support  - 1 x daily - 7 x weekly - 2 sets - 10 reps  - Standing Knee Flexion with Counter Support  - 1 x daily - 7 x weekly - 2 sets - 10 reps                   "

## 2024-08-05 ENCOUNTER — OFFICE VISIT (OUTPATIENT)
Dept: PHYSICAL THERAPY | Facility: CLINIC | Age: 64
End: 2024-08-05
Payer: COMMERCIAL

## 2024-08-05 DIAGNOSIS — S72.002A LEFT DISPLACED FEMORAL NECK FRACTURE (HCC): Primary | ICD-10-CM

## 2024-08-05 PROCEDURE — 97110 THERAPEUTIC EXERCISES: CPT

## 2024-08-05 PROCEDURE — 97112 NEUROMUSCULAR REEDUCATION: CPT

## 2024-08-05 NOTE — PROGRESS NOTES
"Daily Note     Today's date: 2024  Patient name: Liliana Zheng  : 1960  MRN: [id number]  Referring provider: Melissa Tavarez PA-C  Dx:   Encounter Diagnosis     ICD-10-CM    1. Left displaced femoral neck fracture (HCC)  S72.002A                      Subjective: I don't use the cane anymore.       Objective: See treatment diary below      Assessment: Tolerated treatment well. Patient demonstrated fatigue post treatment, exhibited good technique with therapeutic exercises, and would benefit from continued PT  Patient arrives carrying SPC  Patients  present during session.    Plan: Continue per plan of care.      Precautions: History reviewed. No pertinent past medical history.    DOS: 2024  L hip CATHIE anterior approach following Fx      Date: 07/10/2024 7/15/2024 2024 2024 2024   Visit # 1 EVAL 2 3 4 8   Manuals        Gentle stretching  AD AD MR AD                           Neuro Re-Ed        LAQ  (L) 1# 2 x 10 reps (L) 2# 2 x 10 reps (L) 2# 3x10 reps  (L) 3#  20x    BKFO  10 x 5 sec  20/20 YTB 20/20 YTB  20/20 GTB    Bridge     Supine 2x6 Supine 2 x 10 reps w/ add squeeze                Standing marching on round foam 10 x 5 sec hold R UE support   2# (L)                   Ther Ex        NuStep  10 min L1 10 min L2 L2 10 min  Bike x 10 min L3 seat #6   STS  19\" mat x 10 reps; 18\" mat x 10 reps  18\" mat 2 x 10 reps arms crossed 18\" mat 2 x 10 reps + 11lb ball  18\" mat 2 x 10 reps holding 10# TT   Heel raises  @ rail x 20 reps  20x (standing behind nustep) 2 UE support  20x (standing behind nustep) 2 UE support  20x  on 1/2 roll (standing behind nustep) 2 UE support    Standing hip abduction/ext  Alt @ rail 10x each  Alt 20x each (standing behind nustep) 2 UE support 20x each (standing behind nustep) 2 UE support 20x each (standing behind nustep) 2 UE support - standing on round foam  2# (L)   Adduction ball squeeze  10x 5 sec hold  5 sec hold x 2 minutes 5 sec hold x 2 " "minutes ---------      SLR 2 x 10 reps  SLR 2 x 10 reps  SLR 2 x 10 reps  2# AK   Lat walks     5 laps @ mirror  ---   FSU/FSD    4\" 20x ea  6\" 10x each           Ther Activity                        Gait Training  Using own SPC throughout session w/ supervision ----  Up/down 11 steps using 1 handrail x 2 reps (reciprocally)                    Modalities          CP L hip seated x 8 min CP L hip seated x 8 min CP L hip seated x 8 min ----------             Access Code: [email or web address]  URL: [email or web address]  Date: 07/10/2024  Prepared by: Abebe Arellano    Exercises  - Seated Long Arc Quad  - 1 x daily - 7 x weekly - 2 sets - 10 reps  - Seated Hip Adduction Isometrics with Ball  - 1 x daily - 7 x weekly - 2 sets - 10 reps  - Heel Raises with Counter Support  - 1 x daily - 7 x weekly - 2 sets - 10 reps  - Standing Hip Abduction with Counter Support  - 1 x daily - 7 x weekly - 2 sets - 10 reps  - Standing Knee Flexion with Counter Support  - 1 x daily - 7 x weekly - 2 sets - 10 reps                     "

## 2024-08-07 ENCOUNTER — OFFICE VISIT (OUTPATIENT)
Dept: PHYSICAL THERAPY | Facility: CLINIC | Age: 64
End: 2024-08-07
Payer: COMMERCIAL

## 2024-08-07 DIAGNOSIS — S72.002A LEFT DISPLACED FEMORAL NECK FRACTURE (HCC): Primary | ICD-10-CM

## 2024-08-07 PROCEDURE — 97112 NEUROMUSCULAR REEDUCATION: CPT

## 2024-08-07 PROCEDURE — 97110 THERAPEUTIC EXERCISES: CPT

## 2024-08-07 NOTE — PROGRESS NOTES
"Daily Note     Today's date: 2024  Patient name: Liliana Zheng  : 1960  MRN: [id number]  Referring provider: Melissa Tavarez PA-C  Dx:   Encounter Diagnosis     ICD-10-CM    1. Left displaced femoral neck fracture (HCC)  S72.002A           Start Time: 1612          Subjective: I see the surgeon tomorrow. I have not tried to drive yet. When I return to work, I have to stand for 7 hours, 6 days a week.       Objective: See treatment diary below      Assessment: Tolerated treatment well. Patient demonstrated fatigue post treatment, exhibited good technique with therapeutic exercises, and would benefit from continued PT  Patient arrives ambulating without (A) device  Patients  present during session.   Plan: Continue per plan of care.      Precautions: History reviewed. No pertinent past medical history.    DOS: 2024  L hip CATHIE anterior approach following Fx      Date: 07/10/2024 7/15/2024 2024 2024 2024   Visit # 1 EVAL 2 3 4 9   Manuals        Gentle stretching  AD AD MR AD                           Neuro Re-Ed        LAQ  (L) 1# 2 x 10 reps (L) 2# 2 x 10 reps (L) 2# 3x10 reps  (L) 3#  20x    BKFO  10 x 5 sec  20/20 YTB 20/20 YTB  20/20 GTB    Bridge     Supine 2x6 Supine 2 x 10 reps w/ add squeeze                Standing marching on round foam 10 x 5 sec hold R UE support   3# (L)                   Ther Ex        NuStep  10 min L1 10 min L2 L2 10 min  Bike x 10 min L3 seat #6   STS  19\" mat x 10 reps; 18\" mat x 10 reps  18\" mat 2 x 10 reps arms crossed 18\" mat 2 x 10 reps + 11lb ball  18\" mat 2 x 10 reps holding 10# TT   Heel raises  @ rail x 20 reps  20x (standing behind nustep) 2 UE support  20x (standing behind nustep) 2 UE support  20x  on 1/2 roll (standing behind nustep) 2 UE support    Standing hip abduction/ext  Alt @ rail 10x each  Alt 20x each (standing behind nustep) 2 UE support 20x each (standing behind nustep) 2 UE support 20x each (standing behind bike) 2 UE " "support - standing on round foam  3# (L)   Adduction ball squeeze  10x 5 sec hold  5 sec hold x 2 minutes 5 sec hold x 2 minutes ---------      SLR 2 x 10 reps  SLR 2 x 10 reps  SLR 2 x 10 reps  2#    Lat walks     5 laps @ mirror  ---   FSU/FSD    4\" 20x ea  8\" 10x each           Ther Activity                        Gait Training  Using own SPC throughout session w/ supervision ----  Up/down 11 steps using 1 handrail x 2 reps (reciprocally)                    Modalities          CP L hip seated x 8 min CP L hip seated x 8 min CP L hip seated x 8 min ----------             Access Code: [email or web address]  URL: [email or web address]  Date: 07/10/2024  Prepared by: Abebe Arellano    Exercises  - Seated Long Arc Quad  - 1 x daily - 7 x weekly - 2 sets - 10 reps  - Seated Hip Adduction Isometrics with Ball  - 1 x daily - 7 x weekly - 2 sets - 10 reps  - Heel Raises with Counter Support  - 1 x daily - 7 x weekly - 2 sets - 10 reps  - Standing Hip Abduction with Counter Support  - 1 x daily - 7 x weekly - 2 sets - 10 reps  - Standing Knee Flexion with Counter Support  - 1 x daily - 7 x weekly - 2 sets - 10 reps                       "

## 2024-08-08 ENCOUNTER — APPOINTMENT (OUTPATIENT)
Dept: RADIOLOGY | Facility: CLINIC | Age: 64
End: 2024-08-08
Payer: COMMERCIAL

## 2024-08-08 ENCOUNTER — OFFICE VISIT (OUTPATIENT)
Dept: OBGYN CLINIC | Facility: CLINIC | Age: 64
End: 2024-08-08

## 2024-08-08 VITALS — BODY MASS INDEX: 23.04 KG/M2 | WEIGHT: 152 LBS | HEIGHT: 68 IN

## 2024-08-08 DIAGNOSIS — Z96.642 STATUS POST TOTAL REPLACEMENT OF LEFT HIP: Primary | ICD-10-CM

## 2024-08-08 DIAGNOSIS — S72.002A LEFT DISPLACED FEMORAL NECK FRACTURE (HCC): ICD-10-CM

## 2024-08-08 PROCEDURE — 99024 POSTOP FOLLOW-UP VISIT: CPT | Performed by: ORTHOPAEDIC SURGERY

## 2024-08-08 PROCEDURE — 73502 X-RAY EXAM HIP UNI 2-3 VIEWS: CPT

## 2024-08-08 NOTE — PROGRESS NOTES
Assessment/Plan:  1. Status post total replacement of left hip  XR hip/pelv 2-3 vws left if performed      2. Left displaced femoral neck fracture (HCC)  XR hip/pelv 2-3 vws left if performed        Scribe Attestation      I,:  Mariposa Barragan am acting as a scribe while in the presence of the attending physician.:       I,:  Twan Cueva, DO personally performed the services described in this documentation    as scribed in my presence.:           Liliana Zheng is a 63 y.o. female who returns today for follow-up evaluation 6 weeks status post left total hip arthoplasty for femoral neck fracture.  I am very pleased with her clinical presentation today in the office. She understands to continue with her efforts at physical therapy with discharge to home exercise program as appropriate. She will continue to remain out of work from her job as a  at [email or web address]. She no longer requires DVT prophylaxis .  All of her questions and concerns were addressed today.  We will see her back in 6 weeks for repeat clinical evaluation with x-ray on arrival.      Subjective: 6 week S/P    Patient ID: Liliana Zheng is a 63 y.o. female who presents 6 weeks s/p left total hip arthroplasty for femoral neck fracture. Patient is doing very well post operatively. She continues her efforts in physical therapy and is pleased with her progress. Pain is controlled. She uses a single point cane for ambulatory assistance.     Review of Systems   Constitutional:  Negative for chills and fever.   HENT:  Negative for ear pain and sore throat.    Eyes:  Negative for pain and visual disturbance.   Respiratory:  Negative for cough and shortness of breath.    Cardiovascular:  Negative for chest pain and palpitations.   Gastrointestinal:  Negative for abdominal pain and vomiting.   Genitourinary:  Negative for dysuria and hematuria.   Musculoskeletal:  Negative for arthralgias and back pain.   Skin:  Negative for color change and rash.    Neurological:  Negative for seizures and syncope.   All other systems reviewed and are negative.        History reviewed. No pertinent past medical history.    Past Surgical History:   Procedure Laterality Date    ARTHROPLASTY HIP TOTAL ANTERIOR Left 6/27/2024    Procedure: ARTHROPLASTY HIP TOTAL ANTERIOR;  Surgeon: Twan Cueva DO;  Location: [facility];  Service: Orthopedics       Family History   Problem Relation Age of Onset    Stroke Mother     Heart attack Father        Social History     Occupational History    Not on file   Tobacco Use    Smoking status: Never     Passive exposure: Never    Smokeless tobacco: Never   Vaping Use    Vaping status: Never Used   Substance and Sexual Activity    Alcohol use: Not Currently    Drug use: Not Currently    Sexual activity: Not on file         Current Outpatient Medications:     aspirin 325 mg tablet, Take 1 tablet (325 mg total) by mouth 2 (two) times a day, Disp: 84 tablet, Rfl: 0    Calcium Carb-Cholecalciferol (calcium carbonate-vitamin D) 500 mg-5 mcg tablet, Take 1 tablet by mouth daily with breakfast, Disp: 30 tablet, Rfl: 0    No Known Allergies    Objective:  There were no vitals filed for this visit.    Body mass index is 23.11 kg/m².    Left Hip Exam     Tenderness   The patient is experiencing no tenderness.     Range of Motion   Abduction:  50   Adduction:  30   Flexion:  130   External rotation:  50   Internal rotation: 25     Other   Erythema: absent  Sensation: normal  Pulse: present    Comments:  Mobilizes to exam table well   Well healed anterior hip scar   2+ TP and DP pulses with brisk capillary refill to the toes  Sural, saphenous, tibial, superficial and deep peroneal motor and sensory distribution intact  Sensation to light touch               Physical Exam  Vitals and nursing note reviewed.   Constitutional:       Appearance: Normal appearance.   HENT:      Head: Normocephalic.      Right Ear: External ear normal.      Left Ear: External ear  normal.   Eyes:      Extraocular Movements: Extraocular movements intact.      Conjunctiva/sclera: Conjunctivae normal.   Cardiovascular:      Rate and Rhythm: Normal rate.      Pulses: Normal pulses.   Pulmonary:      Effort: Pulmonary effort is normal.      Breath sounds: Normal breath sounds.   Abdominal:      General: Abdomen is flat.   Musculoskeletal:         General: Normal range of motion.      Cervical back: Normal range of motion and neck supple.      Comments: See ortho exam   Skin:     General: Skin is warm and dry.   Neurological:      General: No focal deficit present.      Mental Status: She is alert.   Psychiatric:         Mood and Affect: Mood normal.         Behavior: Behavior normal.         I have personally reviewed pertinent films in PACS.      X-ray of the left hip obtained on 08/09/24 reviewed demonstrating a well-positioned and aligned total hip arthroplasty without evidence of failure.  There is no new fracture, dislocation, lytic or blastic lesion.      This document was created using speech voice recognition software.   Grammatical errors, random word insertions, pronoun errors, and incomplete sentences are an occasional consequence of this system due to software limitations, ambient noise, and hardware issues.   Any formal questions or concerns about content, text, or information contained within the body of this dictation should be directly addressed to the provider for clarification.

## 2024-08-14 ENCOUNTER — OFFICE VISIT (OUTPATIENT)
Dept: PHYSICAL THERAPY | Facility: CLINIC | Age: 64
End: 2024-08-14
Payer: COMMERCIAL

## 2024-08-14 DIAGNOSIS — S72.002A LEFT DISPLACED FEMORAL NECK FRACTURE (HCC): Primary | ICD-10-CM

## 2024-08-14 PROCEDURE — 97110 THERAPEUTIC EXERCISES: CPT

## 2024-08-14 PROCEDURE — 97112 NEUROMUSCULAR REEDUCATION: CPT

## 2024-08-14 NOTE — PROGRESS NOTES
"Daily Note     Today's date: 2024  Patient name: Liliana Zheng  : 1960  MRN: [id number]  Referring provider: Melissa Tavarez PA-C  Dx:   Encounter Diagnosis     ICD-10-CM    1. Left displaced femoral neck fracture (HCC)  S72.002A                      Subjective: I saw the Dr & he was pleased with my progress. I am not returning to work yet. I started driving today.       Objective: See treatment diary below      Assessment: Tolerated treatment well. Patient demonstrated fatigue post treatment, exhibited good technique with therapeutic exercises, and would benefit from continued PT      Plan: Continue per plan of care.      Precautions: History reviewed. No pertinent past medical history.    DOS: 2024  L hip CATHIE anterior approach following Fx      Date: 07/10/2024 7/15/2024 2024 2024 2024 2024   Visit # 1 EVAL 2 3 4 9 10   Manuals         Gentle stretching  AD AD MR AD AD                              Neuro Re-Ed         LAQ  (L) 1# 2 x 10 reps (L) 2# 2 x 10 reps (L) 2# 3x10 reps  (L) 3#  20x  (L) 3#  20x    BKFO  10 x 5 sec  20/20 YTB 20/20 YTB  20/20 GTB     Bridge     Supine 2x6 Supine 2 x 10 reps w/ add squeeze Supine 20x each w/ ball squeeze & GTB abd                 Standing marching on round foam 10 x 5 sec hold R UE support   3# (L) Standing marching on round foam 10 x 5 sec hold R UE support   3# (L)                     Ther Ex         NuStep  10 min L1 10 min L2 L2 10 min  Bike x 10 min L3 seat #6 Bike x 10 min L3 seat #6   STS  19\" mat x 10 reps; 18\" mat x 10 reps  18\" mat 2 x 10 reps arms crossed 18\" mat 2 x 10 reps + 11lb ball  18\" mat 2 x 10 reps holding 10# TT 18\" mat 2 x 10 reps holding 10# TT   Heel raises  @ rail x 20 reps  20x (standing behind nustep) 2 UE support  20x (standing behind nustep) 2 UE support  20x  on  roll (standing behind nustep) 2 UE support  ----   Standing hip abduction/ext  Alt @ rail 10x each  Alt 20x each (standing behind nustep) 2 UE " "support 20x each (standing behind nustep) 2 UE support 20x each (standing behind bike) 2 UE support - standing on round foam  3# (L) 20x each (standing behind bike) 2 UE support - standing on round foam  3# (L)   Adduction ball squeeze  10x 5 sec hold  5 sec hold x 2 minutes 5 sec hold x 2 minutes ---------       SLR 2 x 10 reps  SLR 2 x 10 reps  SLR 2 x 10 reps  2#  SLR 2 x 10 reps  3#    Lat walks     5 laps @ mirror  ---    FSU/FSD    4\" 20x ea  8\" 10x each ----            Ther Activity                           Gait Training  Using own SPC throughout session w/ supervision ----  Up/down 11 steps using 1 handrail x 2 reps (reciprocally)  ----                     Modalities           CP L hip seated x 8 min CP L hip seated x 8 min CP L hip seated x 8 min ---------- ---              Access Code: [email or web address]  URL: [email or web address]  Date: 07/10/2024  Prepared by: Abebe Arellano    Exercises  - Seated Long Arc Quad  - 1 x daily - 7 x weekly - 2 sets - 10 reps  - Seated Hip Adduction Isometrics with Ball  - 1 x daily - 7 x weekly - 2 sets - 10 reps  - Heel Raises with Counter Support  - 1 x daily - 7 x weekly - 2 sets - 10 reps  - Standing Hip Abduction with Counter Support  - 1 x daily - 7 x weekly - 2 sets - 10 reps  - Standing Knee Flexion with Counter Support  - 1 x daily - 7 x weekly - 2 sets - 10 reps                         "

## 2024-08-20 ENCOUNTER — APPOINTMENT (OUTPATIENT)
Dept: PHYSICAL THERAPY | Facility: CLINIC | Age: 64
End: 2024-08-20
Payer: COMMERCIAL

## 2024-08-21 ENCOUNTER — OFFICE VISIT (OUTPATIENT)
Dept: PHYSICAL THERAPY | Facility: CLINIC | Age: 64
End: 2024-08-21
Payer: COMMERCIAL

## 2024-08-21 DIAGNOSIS — S72.002A LEFT DISPLACED FEMORAL NECK FRACTURE (HCC): Primary | ICD-10-CM

## 2024-08-21 PROCEDURE — 97110 THERAPEUTIC EXERCISES: CPT

## 2024-08-21 PROCEDURE — 97112 NEUROMUSCULAR REEDUCATION: CPT

## 2024-08-21 NOTE — PROGRESS NOTES
"Daily Note     Today's date: 2024  Patient name: Liliana Zheng  : 1960  MRN: [id number]  Referring provider: Melissa Tavarez PA-C  Dx:   Encounter Diagnosis     ICD-10-CM    1. Left displaced femoral neck fracture (HCC)  S72.002A                      Subjective: My L hip is feeling stronger.       Objective: See treatment diary below      Assessment: Tolerated treatment well. Patient demonstrated fatigue post treatment, exhibited good technique with therapeutic exercises, and would benefit from continued PT      Plan: Continue per plan of care.      Precautions: History reviewed. No pertinent past medical history.    DOS: 2024  L hip CATHIE anterior approach following Fx      Date: 07/10/2024 7/15/2024 2024 2024 2024 2024   Visit # 1 EVAL 2 3 4 9 11   Manuals         Gentle stretching  AD AD MR AD AD                              Neuro Re-Ed         LAQ  (L) 1# 2 x 10 reps (L) 2# 2 x 10 reps (L) 2# 3x10 reps  (L) 3#  20x  (L) 3#  10x    BKFO  10 x 5 sec  20/20 YTB 20/20 YTB  20/20 GTB     Bridge     Supine 2x6 Supine 2 x 10 reps w/ add squeeze Supine 10x each w/ ball squeeze & GTB abd                 Standing marching on round foam 10 x 5 sec hold R UE support   3# (L) Standing marching on round foam 10 x 5 sec hold R UE support   3# (L)                  [place] biceps/triceps @#8 x 20 reps standing on round foam    Ther Ex         NuStep  10 min L1 10 min L2 L2 10 min  Bike x 10 min L3 seat #6 Bike x 10 min L3 seat #6   STS  19\" mat x 10 reps; 18\" mat x 10 reps  18\" mat 2 x 10 reps arms crossed 18\" mat 2 x 10 reps + 11lb ball  18\" mat 2 x 10 reps holding 10# TT 18\" mat 2 x 10 reps holding 10# TT   Heel raises  @ rail x 20 reps  20x (standing behind nustep) 2 UE support  20x (standing behind nustep) 2 UE support  20x  on / roll (standing behind nustep) 2 UE support  ----   Standing hip abduction/ext  Alt @ rail 10x each  Alt 20x each (standing behind nustep) 2 UE support 20x " "each (standing behind nustep) 2 UE support 20x each (standing behind bike) 2 UE support - standing on round foam  3# (L) 20x each (standing behind bike) 2 UE support - standing on round foam  3# (L)   Adduction ball squeeze  10x 5 sec hold  5 sec hold x 2 minutes 5 sec hold x 2 minutes ---------       SLR 2 x 10 reps  SLR 2 x 10 reps  SLR 2 x 10 reps  2#  SLR 2 x 10 reps  3#    Lat walks     5 laps @ mirror  ---    FSU/FSD    4\" 20x ea  8\" 10x each ----         Leg press (L) 35# 2 x 10 reps seat 8   Ther Activity                           Gait Training  Using own SPC throughout session w/ supervision ----  Up/down 11 steps using 1 handrail x 2 reps (reciprocally)  ----                     Modalities           CP L hip seated x 8 min CP L hip seated x 8 min CP L hip seated x 8 min ---------- ---              Access Code: [email or web address]  URL: [email or web address]  Date: 07/10/2024  Prepared by: Abebe Arellano    Exercises  - Seated Long Arc Quad  - 1 x daily - 7 x weekly - 2 sets - 10 reps  - Seated Hip Adduction Isometrics with Ball  - 1 x daily - 7 x weekly - 2 sets - 10 reps  - Heel Raises with Counter Support  - 1 x daily - 7 x weekly - 2 sets - 10 reps  - Standing Hip Abduction with Counter Support  - 1 x daily - 7 x weekly - 2 sets - 10 reps  - Standing Knee Flexion with Counter Support  - 1 x daily - 7 x weekly - 2 sets - 10 reps                           "

## 2024-08-22 ENCOUNTER — OFFICE VISIT (OUTPATIENT)
Dept: PHYSICAL THERAPY | Facility: CLINIC | Age: 64
End: 2024-08-22
Payer: COMMERCIAL

## 2024-08-22 DIAGNOSIS — S72.002A LEFT DISPLACED FEMORAL NECK FRACTURE (HCC): Primary | ICD-10-CM

## 2024-08-22 PROCEDURE — 97112 NEUROMUSCULAR REEDUCATION: CPT

## 2024-08-22 PROCEDURE — 97110 THERAPEUTIC EXERCISES: CPT

## 2024-08-22 NOTE — PROGRESS NOTES
"Daily Note     Today's date: 2024  Patient name: Liliana Zheng  : 1960  MRN: [id number]  Referring provider: Melissa Tavarez PA-C  Dx:   Encounter Diagnosis     ICD-10-CM    1. Left displaced femoral neck fracture (HCC)  S72.002A           Start Time: 1229          Subjective: I went for a walk last night after therapy & feel good today.       Objective: See treatment diary below      Assessment: Tolerated treatment well. Patient demonstrated fatigue post treatment, exhibited good technique with therapeutic exercises, and would benefit from continued PT      Plan: Continue per plan of care.      Precautions: History reviewed. No pertinent past medical history.    DOS: 2024  L hip CATHIE anterior approach following Fx      Date: 2024 7/15/2024 2024 2024 2024 2024   Visit # 12 2 3 4 9 11   Manuals         L hip stretching AD AD AD MR AD AD                              Neuro Re-Ed         LAQ ---- (L) 1# 2 x 10 reps (L) 2# 2 x 10 reps (L) 2# 3x10 reps  (L) 3#  20x  (L) 3#  10x    BKFO --- 10 x 5 sec  20/20 YTB 20/20 YTB  20/20 GTB     Bridge  Supine 10x each w/ ball squeeze & pilates wheel abd   Supine 2x6 Supine 2 x 10 reps w/ add squeeze Supine 10x each w/ ball squeeze & GTB abd             Standing marching on round foam 10 x 5 sec hold R UE support   3# (L)    Standing marching on round foam 10 x 5 sec hold R UE support   3# (L) Standing marching on round foam 10 x 5 sec hold R UE support   3# (L)    [place] biceps/triceps @#9 x 20 reps standing on round foam               [place] biceps/triceps @#8 x 20 reps standing on round foam    Ther Ex         Bike  7 min L4 seat # 5 10 min L1 10 min L2 L2 10 min  Bike x 10 min L3 seat #6 Bike x 10 min L3 seat #6   STS 18\" mat 2 x 10 reps holding 10# TT 19\" mat x 10 reps; 18\" mat x 10 reps  18\" mat 2 x 10 reps arms crossed 18\" mat 2 x 10 reps + 11lb ball  18\" mat 2 x 10 reps holding 10# TT 18\" mat 2 x 10 reps holding 10# TT   Heel " "raises --- @ rail x 20 reps  20x (standing behind nustep) 2 UE support  20x (standing behind nustep) 2 UE support  20x  on 1/2 roll (standing behind nustep) 2 UE support  ----   Standing hip abduction/ext 20x each (standing behind bike) 2 UE support - standing on round foam  3# (L) Alt @ rail 10x each  Alt 20x each (standing behind nustep) 2 UE support 20x each (standing behind nustep) 2 UE support 20x each (standing behind bike) 2 UE support - standing on round foam  3# (L) 20x each (standing behind bike) 2 UE support - standing on round foam  3# (L)   Adduction ball squeeze ---- 10x 5 sec hold  5 sec hold x 2 minutes 5 sec hold x 2 minutes ---------     SLR 2 x 10 reps  3#   SLR 2 x 10 reps  SLR 2 x 10 reps  SLR 2 x 10 reps  2#  SLR 2 x 10 reps  3#    Lat walks     5 laps @ mirror  ---    FSU/FSD    4\" 20x ea  8\" 10x each ----    Leg press (L) 35# 2 x 10 reps seat 8     Leg press (L) 35# 2 x 10 reps seat 8   Ther Activity                           Gait Training  Using own SPC throughout session w/ supervision ----  Up/down 11 steps using 1 handrail x 2 reps (reciprocally)  ----                     Modalities           CP L hip seated x 8 min CP L hip seated x 8 min CP L hip seated x 8 min ---------- ---              Access Code: [email or web address]  URL: [email or web address]  Date: 07/10/2024  Prepared by: Abebe Arellano    Exercises  - Seated Long Arc Quad  - 1 x daily - 7 x weekly - 2 sets - 10 reps  - Seated Hip Adduction Isometrics with Ball  - 1 x daily - 7 x weekly - 2 sets - 10 reps  - Heel Raises with Counter Support  - 1 x daily - 7 x weekly - 2 sets - 10 reps  - Standing Hip Abduction with Counter Support  - 1 x daily - 7 x weekly - 2 sets - 10 reps  - Standing Knee Flexion with Counter Support  - 1 x daily - 7 x weekly - 2 sets - 10 reps                             "

## 2024-08-26 ENCOUNTER — OFFICE VISIT (OUTPATIENT)
Dept: PHYSICAL THERAPY | Facility: CLINIC | Age: 64
End: 2024-08-26
Payer: COMMERCIAL

## 2024-08-26 DIAGNOSIS — S72.002A LEFT DISPLACED FEMORAL NECK FRACTURE (HCC): Primary | ICD-10-CM

## 2024-08-26 PROCEDURE — 97112 NEUROMUSCULAR REEDUCATION: CPT

## 2024-08-26 PROCEDURE — 97110 THERAPEUTIC EXERCISES: CPT

## 2024-08-26 NOTE — PROGRESS NOTES
"Daily Note     Today's date: 2024  Patient name: Liliana Zheng  : 1960  MRN: [id number]  Referring provider: Melissa Tavarez PA-C  Dx:   Encounter Diagnosis     ICD-10-CM    1. Left displaced femoral neck fracture (HCC)  S72.002A           Start Time: 1225          Subjective: My L hip is feeling stronger.       Objective: See treatment diary below      Assessment: Tolerated treatment well. Patient demonstrated fatigue post treatment, exhibited good technique with therapeutic exercises, and would benefit from continued PT      Plan: Continue per plan of care.      Precautions: History reviewed. No pertinent past medical history.    DOS: 2024  L hip CATHIE anterior approach following Fx      Date: 2024 7/15/2024 2024 2024 2024 2024   Visit # 13 2 3 4 9 11   Manuals         L hip stretching AD AD AD MR AD AD                              Neuro Re-Ed         LAQ ---- (L) 1# 2 x 10 reps (L) 2# 2 x 10 reps (L) 2# 3x10 reps  (L) 3#  20x  (L) 3#  10x    BKFO --- 10 x 5 sec  20/20 YTB 20/20 YTB  20/20 GTB     Bridge  Supine 15x each w/ ball squeeze & pilates wheel abd   Supine 2x6 Supine 2 x 10 reps w/ add squeeze Supine 10x each w/ ball squeeze & GTB abd             Standing marching on round foam 10 x 5 sec hold R UE support   3# (L/R)    Standing marching on round foam 10 x 5 sec hold R UE support   3# (L) Standing marching on round foam 10 x 5 sec hold R UE support   3# (L)    [place] biceps/triceps @#10 x 20 reps standing on round foam               [place] biceps/triceps @#8 x 20 reps standing on round foam    Ther Ex         Bike  7 min L4 seat # 5 10 min L1 10 min L2 L2 10 min  Bike x 10 min L3 seat #6 Bike x 10 min L3 seat #6   STS 18\" mat 2 x 10 reps holding 10# TT 19\" mat x 10 reps; 18\" mat x 10 reps  18\" mat 2 x 10 reps arms crossed 18\" mat 2 x 10 reps + 11lb ball  18\" mat 2 x 10 reps holding 10# TT 18\" mat 2 x 10 reps holding 10# TT   Heel raises --- @ rail x 20 reps  20x " "(standing behind nustep) 2 UE support  20x (standing behind nustep) 2 UE support  20x  on 1/2 roll (standing behind nustep) 2 UE support  ----   Standing hip abduction/ext 20x each (standing behind bike) 2 UE support - standing on round foam  3# (L/R) Alt @ rail 10x each  Alt 20x each (standing behind nustep) 2 UE support 20x each (standing behind nustep) 2 UE support 20x each (standing behind bike) 2 UE support - standing on round foam  3# (L) 20x each (standing behind bike) 2 UE support - standing on round foam  3# (L)   Adduction ball squeeze ---- 10x 5 sec hold  5 sec hold x 2 minutes 5 sec hold x 2 minutes ---------     SLR 10x, 15x -   3#   SLR 2 x 10 reps  SLR 2 x 10 reps  SLR 2 x 10 reps  2#  SLR 2 x 10 reps  3#    Lat walks     5 laps @ mirror  ---    FSU/FSD    4\" 20x ea  8\" 10x each ----    Leg press (L) 45# 3 x 10 reps seat 8     Leg press (L) 35# 2 x 10 reps seat 8   Ther Activity                           Gait Training  Using own SPC throughout session w/ supervision ----  Up/down 11 steps using 1 handrail x 2 reps (reciprocally)  ----                     Modalities           CP L hip seated x 8 min CP L hip seated x 8 min CP L hip seated x 8 min ---------- ---              Access Code: [email or web address]  URL: [email or web address]  Date: 07/10/2024  Prepared by: Abebe Arellano    Exercises  - Seated Long Arc Quad  - 1 x daily - 7 x weekly - 2 sets - 10 reps  - Seated Hip Adduction Isometrics with Ball  - 1 x daily - 7 x weekly - 2 sets - 10 reps  - Heel Raises with Counter Support  - 1 x daily - 7 x weekly - 2 sets - 10 reps  - Standing Hip Abduction with Counter Support  - 1 x daily - 7 x weekly - 2 sets - 10 reps  - Standing Knee Flexion with Counter Support  - 1 x daily - 7 x weekly - 2 sets - 10 reps                               "

## 2024-08-28 ENCOUNTER — OFFICE VISIT (OUTPATIENT)
Dept: PHYSICAL THERAPY | Facility: CLINIC | Age: 64
End: 2024-08-28
Payer: COMMERCIAL

## 2024-08-28 DIAGNOSIS — S72.002A LEFT DISPLACED FEMORAL NECK FRACTURE (HCC): Primary | ICD-10-CM

## 2024-08-28 PROCEDURE — 97112 NEUROMUSCULAR REEDUCATION: CPT

## 2024-08-28 PROCEDURE — 97110 THERAPEUTIC EXERCISES: CPT

## 2024-08-28 NOTE — PROGRESS NOTES
"Daily Note     Today's date: 2024  Patient name: Liliana Zheng  : 1960  MRN: [id number]  Referring provider: Melissa Tavarez PA-C  Dx: No diagnosis found.               Subjective: Doing well      Objective: See treatment diary below      Assessment: Tolerated treatment well. Patient would benefit from continued PT.  Good effort and motivation with all exercises. Anticipate good prognosis.  Advised to continue up until MD appt.      Plan: Continue per plan of care.      Precautions: History reviewed. No pertinent past medical history.    DOS: 2024  L hip CATHIE anterior approach following Fx      Date: 2024   Visit # 13 14 3 4 9 11   Manuals         L hip stretching AD AD AD MR AD AD                              Neuro Re-Ed         LAQ ---- (L) 3# 2 x 10 reps (L) 2# 2 x 10 reps (L) 2# 3x10 reps  (L) 3#  20x  (L) 3#  10x    BKFO --- 10 x 5 sec  20/20 YTB 20/20 YTB  20/20 GTB     Bridge  Supine 15x each w/ ball squeeze & pilates wheel abd Supine x 30 with ball squeeze  Supine 2x6 Supine 2 x 10 reps w/ add squeeze Supine 10x each w/ ball squeeze & GTB abd             Standing marching on round foam 10 x 5 sec hold R UE support   3# (L/R) Marching on foam x 20 5\" hold   Standing marching on round foam 10 x 5 sec hold R UE support   3# (L) Standing marching on round foam 10 x 5 sec hold R UE support   3# (L)    [place] biceps/triceps @#10 x 20 reps standing on round foam  [place] bicep / tricep 10 # x 20 reps on foam             [place] biceps/triceps @#8 x 20 reps standing on round foam    Ther Ex         Bike  7 min L4 seat # 5 9 L4 10 min L2 L2 10 min  Bike x 10 min L3 seat #6 Bike x 10 min L3 seat #6   STS 18\" mat 2 x 10 reps holding 10# TT 18\" mat x 25 holding 10# TT 18\" mat 2 x 10 reps arms crossed 18\" mat 2 x 10 reps + 11lb ball  18\" mat 2 x 10 reps holding 10# TT 18\" mat 2 x 10 reps holding 10# TT   Heel raises --- @ rail x 20 reps  20x (standing " "behind nustep) 2 UE support  20x (standing behind nustep) 2 UE support  20x  on 1/2 roll (standing behind nustep) 2 UE support  ----   Standing hip abduction/ext 20x each (standing behind bike) 2 UE support - standing on round foam  3# (L/R) 20x 3# hip abduction standing on foam  Alt 20x each (standing behind nustep) 2 UE support 20x each (standing behind nustep) 2 UE support 20x each (standing behind bike) 2 UE support - standing on round foam  3# (L) 20x each (standing behind bike) 2 UE support - standing on round foam  3# (L)   Adduction ball squeeze ---- 10x 5 sec hold  5 sec hold x 2 minutes 5 sec hold x 2 minutes ---------     SLR 10x, 15x -   3#  SLR 3# x 15  SLR 2 x 10 reps  SLR 2 x 10 reps  SLR 2 x 10 reps  2#  SLR 2 x 10 reps  3#    Lat walks   On blue foam x 6 laps  5 laps @ mirror  ---    FSU/FSD    4\" 20x ea  8\" 10x each ----    Leg press (L) 45# 3 x 10 reps seat 8 Leg press left 45# 30x seat 10    Leg press (L) 35# 2 x 10 reps seat 8   Ther Activity                           Gait Training  Using own SPC throughout session w/ supervision ----  Up/down 11 steps using 1 handrail x 2 reps (reciprocally)  ----                     Modalities           CP L hip seated x 8 min CP L hip seated x 8 min CP L hip seated x 8 min ---------- ---              Access Code: [email or web address]  URL: [email or web address]  Date: 07/10/2024  Prepared by: Abebe Arellano    Exercises  - Seated Long Arc Quad  - 1 x daily - 7 x weekly - 2 sets - 10 reps  - Seated Hip Adduction Isometrics with Ball  - 1 x daily - 7 x weekly - 2 sets - 10 reps  - Heel Raises with Counter Support  - 1 x daily - 7 x weekly - 2 sets - 10 reps  - Standing Hip Abduction with Counter Support  - 1 x daily - 7 x weekly - 2 sets - 10 reps  - Standing Knee Flexion with Counter Support  - 1 x daily - 7 x weekly - 2 sets - 10 reps                                 "

## 2024-09-03 ENCOUNTER — OFFICE VISIT (OUTPATIENT)
Dept: PHYSICAL THERAPY | Facility: CLINIC | Age: 64
End: 2024-09-03
Payer: COMMERCIAL

## 2024-09-03 DIAGNOSIS — S72.002A LEFT DISPLACED FEMORAL NECK FRACTURE (HCC): Primary | ICD-10-CM

## 2024-09-03 PROCEDURE — 97110 THERAPEUTIC EXERCISES: CPT

## 2024-09-03 PROCEDURE — 97112 NEUROMUSCULAR REEDUCATION: CPT

## 2024-09-03 NOTE — PROGRESS NOTES
"Daily Note     Today's date: 9/3/2024  Patient name: Liliana Zheng  : 1960  MRN: [id number]  Referring provider: Melissa Tavarez PA-C  Dx:   Encounter Diagnosis     ICD-10-CM    1. Left displaced femoral neck fracture (HCC)  S72.002A                      Subjective: I am feeling stronger.       Objective: See treatment diary below      Assessment: Tolerated treatment well. Patient demonstrated fatigue post treatment, exhibited good technique with therapeutic exercises, and would benefit from continued PT      Plan: Continue per plan of care.      Precautions: History reviewed. No pertinent past medical history.    DOS: 2024  L hip CATHIE anterior approach following Fx      Date: 2024 9/3/2024 2024 2024 2024 2024   Visit # 13 15 3 4 9 11   Manuals         L hip stretching AD AD AD MR AD AD                              Neuro Re-Ed         LAQ ---- (L) 3# 2 x 10 reps (L) 2# 2 x 10 reps (L) 2# 3x10 reps  (L) 3#  20x  (L) 3#  10x    BKFO --- Clamshells GTB 10/10 20/20 YTB 20/20 YTB  20/20 GTB     Bridge  Supine 15x each w/ ball squeeze & pilates wheel abd 10x w/ add squeeze; 10x w/ GTB abd  Supine 2x6 Supine 2 x 10 reps w/ add squeeze Supine 10x each w/ ball squeeze & GTB abd             Standing marching on round foam 10 x 5 sec hold R UE support   3# (L/R) -----   Standing marching on round foam 10 x 5 sec hold R UE support   3# (L) Standing marching on round foam 10 x 5 sec hold R UE support   3# (L)    [place] biceps/triceps @#10 x 20 reps standing on round foam  [place] bicep / tricep 10 # x 20 reps on foam             [place] biceps/triceps @#8 x 20 reps standing on round foam    Ther Ex         Bike  7 min L4 seat # 5 10 min L4 10 min L2 L2 10 min  Bike x 10 min L3 seat #6 Bike x 10 min L3 seat #6   STS 18\" mat 2 x 10 reps holding 10# TT 18\" mat x 20 reps holding 10# TT 18\" mat 2 x 10 reps arms crossed 18\" mat 2 x 10 reps + 11lb ball  18\" mat 2 x 10 reps holding 10# TT 18\" mat 2 " "x 10 reps holding 10# TT   Heel raises --- 20x on 1/2 roll 20x (standing behind nustep) 2 UE support  20x (standing behind nustep) 2 UE support  20x  on 1/2 roll (standing behind nustep) 2 UE support  ----   Standing hip abduction/ext 20x each (standing behind bike) 2 UE support - standing on round foam  3# (L/R) ----- Alt 20x each (standing behind nustep) 2 UE support 20x each (standing behind nustep) 2 UE support 20x each (standing behind bike) 2 UE support - standing on round foam  3# (L) 20x each (standing behind bike) 2 UE support - standing on round foam  3# (L)   Adduction ball squeeze ---- (W/ bridging) 5 sec hold x 2 minutes 5 sec hold x 2 minutes ---------     SLR 10x, 15x -   3#  SLR 3# x 20x reps  SLR 2 x 10 reps  SLR 2 x 10 reps  SLR 2 x 10 reps  2#  SLR 2 x 10 reps  3#    Lat walks   -----  5 laps @ mirror  ---    FSU/FSD    4\" 20x ea  8\" 10x each ----    Leg press (L) 45# 3 x 10 reps seat 8 Leg press left 50# 30x seat 10    Leg press (L) 35# 2 x 10 reps seat 8   Ther Activity                           Gait Training   ----  Up/down 11 steps using 1 handrail x 2 reps (reciprocally)  ----                     Modalities            CP L hip seated x 8 min CP L hip seated x 8 min ---------- ---              Access Code: [email or web address]  URL: [email or web address]  Date: 07/10/2024  Prepared by: Abebe Arellano    Exercises  - Seated Long Arc Quad  - 1 x daily - 7 x weekly - 2 sets - 10 reps  - Seated Hip Adduction Isometrics with Ball  - 1 x daily - 7 x weekly - 2 sets - 10 reps  - Heel Raises with Counter Support  - 1 x daily - 7 x weekly - 2 sets - 10 reps  - Standing Hip Abduction with Counter Support  - 1 x daily - 7 x weekly - 2 sets - 10 reps  - Standing Knee Flexion with Counter Support  - 1 x daily - 7 x weekly - 2 sets - 10 reps                                   " intact

## 2024-09-05 ENCOUNTER — OFFICE VISIT (OUTPATIENT)
Dept: PHYSICAL THERAPY | Facility: CLINIC | Age: 64
End: 2024-09-05
Payer: COMMERCIAL

## 2024-09-05 DIAGNOSIS — S72.002A LEFT DISPLACED FEMORAL NECK FRACTURE (HCC): Primary | ICD-10-CM

## 2024-09-05 PROCEDURE — 97112 NEUROMUSCULAR REEDUCATION: CPT

## 2024-09-05 PROCEDURE — 97110 THERAPEUTIC EXERCISES: CPT

## 2024-09-05 NOTE — PROGRESS NOTES
"Daily Note     Today's date: 2024  Patient name: Liliana Zheng  : 1960  MRN: [id number]  Referring provider: Melissa Tavarez PA-C  Dx:   Encounter Diagnosis     ICD-10-CM    1. Left displaced femoral neck fracture (HCC)  S72.002A                      Subjective: I didn't go to the gym today.       Objective: See treatment diary below      Assessment: Tolerated treatment well. Patient demonstrated fatigue post treatment, exhibited good technique with therapeutic exercises, and would benefit from continued PT      Plan: Continue per plan of care.      Precautions: History reviewed. No pertinent past medical history.    DOS: 2024  L hip CATHIE anterior approach following Fx      Date: 2024 9/3/2024 2024 2024 2024 2024   Visit # 13 15 16 4 9 11   Manuals         L hip stretching AD AD AD MR AD AD                              Neuro Re-Ed         LAQ ---- (L) 3# 2 x 10 reps (L) 3# 2 x 10 reps (L) 2# 3x10 reps  (L) 3#  20x  (L) 3#  10x    BKFO --- Clamshells GTB 10/10 Clamshells GTB 10/10 20/20 YTB  20/20 GTB     Bridge  Supine 15x each w/ ball squeeze & pilates wheel abd 10x w/ add squeeze; 10x w/ GTB abd 10x w/ add squeeze; 10x w/ GTB abd Supine 2x6 Supine 2 x 10 reps w/ add squeeze Supine 10x each w/ ball squeeze & GTB abd             Standing marching on round foam 10 x 5 sec hold R UE support   3# (L/R) -----   Standing marching on round foam 10 x 5 sec hold R UE support   3# (L) Standing marching on round foam 10 x 5 sec hold R UE support   3# (L)    [place] biceps/triceps @#10 x 20 reps standing on round foam  [place] bicep / tricep 10 # x 20 reps on foam [place] bicep / tricep 10 # x 20 reps on foam            [place] biceps/triceps @#8 x 20 reps standing on round foam    Ther Ex         Bike  7 min L4 seat # 5 10 min L4 10 min L4 L2 10 min  Bike x 10 min L3 seat #6 Bike x 10 min L3 seat #6   STS 18\" mat 2 x 10 reps holding 10# TT 18\" mat x 20 reps holding 10# TT low mat x 20 " "reps holding 10# TT 18\" mat 2 x 10 reps + 11lb ball  18\" mat 2 x 10 reps holding 10# TT 18\" mat 2 x 10 reps holding 10# TT   Heel raises --- 20x on 1/2 roll 20x on 1/2 roll 20x (standing behind nustep) 2 UE support  20x  on 1/2 roll (standing behind nustep) 2 UE support  ----   Standing hip abduction/ext 20x each (standing behind bike) 2 UE support - standing on round foam  3# (L/R) ----- ----- 20x each (standing behind nustep) 2 UE support 20x each (standing behind bike) 2 UE support - standing on round foam  3# (L) 20x each (standing behind bike) 2 UE support - standing on round foam  3# (L)   Adduction ball squeeze ---- (W/ bridging) (W/ bridging) 5 sec hold x 2 minutes ---------     SLR 10x, 15x -   3#  SLR 3# x 20x reps  SLR 3# x 20x reps  SLR 2 x 10 reps  SLR 2 x 10 reps  2#  SLR 2 x 10 reps  3#    Lat walks   -----  5 laps @ mirror  ---    FSU/FSD    4\" 20x ea  8\" 10x each ----    Leg press (L) 45# 3 x 10 reps seat 8 Leg press left 50# 30x seat 10 Leg press left 50# 30x seat 10   Leg press (L) 35# 2 x 10 reps seat 8   Ther Activity                           Gait Training   ----  Up/down 11 steps using 1 handrail x 2 reps (reciprocally)  ----                     Modalities             CP L hip seated x 8 min ---------- ---              Access Code: [email or web address]  URL: [email or web address]  Date: 07/10/2024  Prepared by: Abebe Arellano    Exercises  - Seated Long Arc Quad  - 1 x daily - 7 x weekly - 2 sets - 10 reps  - Seated Hip Adduction Isometrics with Ball  - 1 x daily - 7 x weekly - 2 sets - 10 reps  - Heel Raises with Counter Support  - 1 x daily - 7 x weekly - 2 sets - 10 reps  - Standing Hip Abduction with Counter Support  - 1 x daily - 7 x weekly - 2 sets - 10 reps  - Standing Knee Flexion with Counter Support  - 1 x daily - 7 x weekly - 2 sets - 10 reps                                     "

## 2024-09-10 ENCOUNTER — OFFICE VISIT (OUTPATIENT)
Dept: PHYSICAL THERAPY | Facility: CLINIC | Age: 64
End: 2024-09-10
Payer: COMMERCIAL

## 2024-09-10 DIAGNOSIS — S72.002A LEFT DISPLACED FEMORAL NECK FRACTURE (HCC): Primary | ICD-10-CM

## 2024-09-10 PROCEDURE — 97110 THERAPEUTIC EXERCISES: CPT

## 2024-09-10 PROCEDURE — 97112 NEUROMUSCULAR REEDUCATION: CPT

## 2024-09-10 NOTE — PROGRESS NOTES
Daily Note     Today's date: 9/10/2024  Patient name: Liliana Zheng  : 1960  MRN: [id number]  Referring provider: Melissa Tavarez PA-C  Dx:   Encounter Diagnosis     ICD-10-CM    1. Left displaced femoral neck fracture (HCC)  S72.002A           Start Time: 1225          Subjective: My leg is feeling stronger.       Objective: See treatment diary below      Assessment: Tolerated treatment well. Patient demonstrated fatigue post treatment, exhibited good technique with therapeutic exercises, and would benefit from continued PT      Plan: Continue per plan of care.      Precautions: History reviewed. No pertinent past medical history.    DOS: 2024  L hip CATHIE anterior approach following Fx      Date: 2024 9/3/2024 9/10/2024 2024 2024 2024   Visit # 13 15 17 4 9 11   Manuals         L hip stretching AD AD AD MR AD AD                              Neuro Re-Ed         LAQ ---- (L) 3# 2 x 10 reps (L) 3# 2 x 10 reps (L) 2# 3x10 reps  (L) 3#  20x  (L) 3#  10x    BKFO --- Clamshells GTB 10/10 ---- 20/20 YTB  20/20 GTB     Bridge  Supine 15x each w/ ball squeeze & pilates wheel abd 10x w/ add squeeze; 10x w/ GTB abd 10x w/ add squeeze; 10x w/ GTB abd Supine 2x6 Supine 2 x 10 reps w/ add squeeze Supine 10x each w/ ball squeeze & GTB abd             Standing marching on round foam 10 x 5 sec hold R UE support   3# (L/R) ----- Supine marching w/ GTB x 20 reps   Standing marching on round foam 10 x 5 sec hold R UE support   3# (L) Standing marching on round foam 10 x 5 sec hold R UE support   3# (L)    Egnar biceps/triceps @#10 x 20 reps standing on round foam  Egnar bicep / tricep 10 # x 20 reps on foam Fay bicep / tricep 11 # x 20 reps on foam         Standing squats (holding back of bike) x 10 reps    Egnar biceps/triceps @#8 x 20 reps standing on round foam    Ther Ex         Bike  7 min L4 seat # 5 10 min L4 10 min L4 L2 10 min  Bike x 10 min L3 seat #6 Bike x 10 min L3 seat #6   STS  "18\" mat 2 x 10 reps holding 10# TT 18\" mat x 20 reps holding 10# TT low mat x 10 reps holding 10# TT (mini squats) 18\" mat 2 x 10 reps + 11lb ball  18\" mat 2 x 10 reps holding 10# TT 18\" mat 2 x 10 reps holding 10# TT   Heel raises --- 20x on 1/2 roll ----- 20x (standing behind nustep) 2 UE support  20x  on 1/2 roll (standing behind nustep) 2 UE support  ----   Standing hip abduction/ext 20x each (standing behind bike) 2 UE support - standing on round foam  3# (L/R) ----- ----- 20x each (standing behind nustep) 2 UE support 20x each (standing behind bike) 2 UE support - standing on round foam  3# (L) 20x each (standing behind bike) 2 UE support - standing on round foam  3# (L)   Adduction ball squeeze ---- (W/ bridging) (W/ bridging) 5 sec hold x 2 minutes ---------     SLR 10x, 15x -   3#  SLR 3# x 20x reps  SLR 3# x 20x reps  SLR 2 x 10 reps  SLR 2 x 10 reps  2#  SLR 2 x 10 reps  3#    Lat walks   -----  5 laps @ mirror  ---    FSU/FSD    4\" 20x ea  8\" 10x each ----    Leg press (L) 45# 3 x 10 reps seat 8 Leg press left 49# 30x seat 10 Leg press left 49# 30x seat 10   Leg press (L) 35# 2 x 10 reps seat 8   Ther Activity                           Gait Training   ----  Up/down 11 steps using 1 handrail x 2 reps (reciprocally)  ----                     Modalities             CP L hip seated x 8 min ---------- ---              Access Code: [email or web address]  URL: [email or web address]  Date: 07/10/2024  Prepared by: Abebe Arellano    Exercises  - Seated Long Arc Quad  - 1 x daily - 7 x weekly - 2 sets - 10 reps  - Seated Hip Adduction Isometrics with Ball  - 1 x daily - 7 x weekly - 2 sets - 10 reps  - Heel Raises with Counter Support  - 1 x daily - 7 x weekly - 2 sets - 10 reps  - Standing Hip Abduction with Counter Support  - 1 x daily - 7 x weekly - 2 sets - 10 reps  - Standing Knee Flexion with Counter Support  - 1 x daily - 7 x weekly - 2 sets - 10 reps                                       " show

## 2024-09-13 ENCOUNTER — OFFICE VISIT (OUTPATIENT)
Dept: PHYSICAL THERAPY | Facility: CLINIC | Age: 64
End: 2024-09-13
Payer: COMMERCIAL

## 2024-09-13 DIAGNOSIS — S72.002A LEFT DISPLACED FEMORAL NECK FRACTURE (HCC): Primary | ICD-10-CM

## 2024-09-13 PROCEDURE — 97112 NEUROMUSCULAR REEDUCATION: CPT

## 2024-09-13 PROCEDURE — 97110 THERAPEUTIC EXERCISES: CPT

## 2024-09-13 NOTE — PROGRESS NOTES
Daily Note     Today's date: 2024  Patient name: Liliana Zheng  : 1960  MRN: [id number]  Referring provider: Melissa Tavarez PA-C  Dx:   Encounter Diagnosis     ICD-10-CM    1. Left displaced femoral neck fracture (HCC)  S72.002A           Start Time: 1100          Subjective: My legs are feeling stronger & it's easier to squat.      Objective: See treatment diary below      Assessment: Tolerated treatment well. Patient demonstrated fatigue post treatment, exhibited good technique with therapeutic exercises, and would benefit from continued PT      Plan: Progress note during next visit.      Precautions: History reviewed. No pertinent past medical history.    DOS: 2024  L hip CATHIE anterior approach following Fx      Date: 2024 9/3/2024 9/10/2024 2024 2024 2024   Visit # 13 15 17 18 9 11   Manuals         L hip stretching AD AD AD AD AD AD                              Neuro Re-Ed         LAQ ---- (L) 3# 2 x 10 reps (L) 3# 2 x 10 reps ----- (L) 3#  20x  (L) 3#  10x    BKFO --- Clamshells GTB 10/10 ---- ---- 20/20 GTB     Bridge  Supine 15x each w/ ball squeeze & pilates wheel abd 10x w/ add squeeze; 10x w/ GTB abd 10x w/ add squeeze; 10x w/ GTB abd 20x w/ add squeeze; 20x w/ GTB abd Supine 2 x 10 reps w/ add squeeze Supine 10x each w/ ball squeeze & GTB abd       Fay lateral gait @#8 - 2 x 10 reps       Standing marching on round foam 10 x 5 sec hold R UE support   3# (L/R) ----- Supine marching w/ GTB x 20 reps  Supine marching w/ GTB x 20 reps  Standing marching on round foam 10 x 5 sec hold R UE support   3# (L) Standing marching on round foam 10 x 5 sec hold R UE support   3# (L)    Central biceps/triceps @#10 x 20 reps standing on round foam  Fay bicep / tricep 10 # x 20 reps on foam Central bicep / tricep 11 # x 20 reps on foam Central bicep / tricep 11 # x 20 reps on foam        Standing squats (holding back of bike) x 10 reps  Standing squats (holding fay bar) x 20  "reps   Fay biceps/triceps @#8 x 20 reps standing on round foam    Ther Ex         Bike  7 min L4 seat # 5 10 min L4 10 min L4 10 min L4 Bike x 10 min L3 seat #6 Bike x 10 min L3 seat #6   STS 18\" mat 2 x 10 reps holding 10# TT 18\" mat x 20 reps holding 10# TT low mat x 10 reps holding 10# TT (mini squats) low mat x 20 reps holding 10# TT (mini squats) 18\" mat 2 x 10 reps holding 10# TT 18\" mat 2 x 10 reps holding 10# TT   Heel raises --- 20x on 1/2 roll ----- ---- 20x  on 1/2 roll (standing behind nustep) 2 UE support  ----   Standing hip abduction/ext 20x each (standing behind bike) 2 UE support - standing on round foam  3# (L/R) ----- ----- ------ 20x each (standing behind bike) 2 UE support - standing on round foam  3# (L) 20x each (standing behind bike) 2 UE support - standing on round foam  3# (L)   Adduction ball squeeze ---- (W/ bridging) (W/ bridging) ----- ---------     SLR 10x, 15x -   3#  SLR 3# x 20x reps  SLR 3# x 20x reps  SLR 3# x 20x reps  SLR 2 x 10 reps  2#  SLR 2 x 10 reps  3#    Lat walks   -----   ---    FSU/FSD     8\" 10x each ----    Leg press (L) 45# 3 x 10 reps seat 8 Leg press left 49# 30x seat 10 Leg press left 49# 30x seat 10 Leg press left 51# 30x seat 10  Leg press (L) 35# 2 x 10 reps seat 8   Ther Activity                           Gait Training   ----   ----                     Modalities              ---------- ---              Access Code: [email or web address]  URL: [email or web address]  Date: 07/10/2024  Prepared by: Abebe Arellano    Exercises  - Seated Long Arc Quad  - 1 x daily - 7 x weekly - 2 sets - 10 reps  - Seated Hip Adduction Isometrics with Ball  - 1 x daily - 7 x weekly - 2 sets - 10 reps  - Heel Raises with Counter Support  - 1 x daily - 7 x weekly - 2 sets - 10 reps  - Standing Hip Abduction with Counter Support  - 1 x daily - 7 x weekly - 2 sets - 10 reps  - Standing Knee Flexion with Counter Support  - 1 x daily - 7 x weekly - 2 sets - 10 " reps

## 2024-09-19 ENCOUNTER — EVALUATION (OUTPATIENT)
Dept: PHYSICAL THERAPY | Facility: CLINIC | Age: 64
End: 2024-09-19
Payer: COMMERCIAL

## 2024-09-19 ENCOUNTER — OFFICE VISIT (OUTPATIENT)
Dept: OBGYN CLINIC | Facility: CLINIC | Age: 64
End: 2024-09-19

## 2024-09-19 ENCOUNTER — APPOINTMENT (OUTPATIENT)
Dept: RADIOLOGY | Facility: CLINIC | Age: 64
End: 2024-09-19
Payer: COMMERCIAL

## 2024-09-19 VITALS
DIASTOLIC BLOOD PRESSURE: 88 MMHG | BODY MASS INDEX: 23.61 KG/M2 | HEIGHT: 68 IN | HEART RATE: 94 BPM | WEIGHT: 155.8 LBS | SYSTOLIC BLOOD PRESSURE: 142 MMHG

## 2024-09-19 DIAGNOSIS — Z96.642 STATUS POST TOTAL REPLACEMENT OF LEFT HIP: ICD-10-CM

## 2024-09-19 DIAGNOSIS — S72.002A LEFT DISPLACED FEMORAL NECK FRACTURE (HCC): Primary | ICD-10-CM

## 2024-09-19 DIAGNOSIS — Z96.642 STATUS POST TOTAL REPLACEMENT OF LEFT HIP: Primary | ICD-10-CM

## 2024-09-19 DIAGNOSIS — S72.002A LEFT DISPLACED FEMORAL NECK FRACTURE (HCC): ICD-10-CM

## 2024-09-19 PROCEDURE — 73502 X-RAY EXAM HIP UNI 2-3 VIEWS: CPT

## 2024-09-19 PROCEDURE — 97110 THERAPEUTIC EXERCISES: CPT | Performed by: PHYSICAL MEDICINE & REHABILITATION

## 2024-09-19 PROCEDURE — 99024 POSTOP FOLLOW-UP VISIT: CPT | Performed by: ORTHOPAEDIC SURGERY

## 2024-09-19 PROCEDURE — 97112 NEUROMUSCULAR REEDUCATION: CPT | Performed by: PHYSICAL MEDICINE & REHABILITATION

## 2024-09-19 NOTE — PROGRESS NOTES
PT Discharge    Today's date: 2024  Patient name: Liliana Zheng  : 1960  MRN: [id number]  Referring provider: Melissa Tavarez PA-C  Dx:   Encounter Diagnosis     ICD-10-CM    1. Left displaced femoral neck fracture (HCC)  S72.002A                        Assessment  Impairments: activity intolerance  Symptom irritability: low    Assessment details: Liliana Zheng is an 63 y.o. female  arriving to clinic status post left CATHIE performed on 2024. Patient's wound not assessed at this time as bandage still donned. Patient arrived at clinic independently ambulating with RW and step through pattern. Hip range of motion and strength at this time. Pain managed with medication and self applied ice. Functionally patient is limited with ADL's, recreational activities, work-related activities, engaging in social activities, ambulation, stair negotiation, lifting/carrying, transfers. Patient's TUG score measured at 21 indicating patient is at increased risk for falls. Patient lives with  who is available for support. Distal LE strength intact. Patient has been educated in post-op contraindications / precautions, weight bearing status, home exercise program, and plan of care. Patient would benefit from skilled physical therapy services to address their aforementioned functional limitations and progress towards prior level of function and independence with home exercise program.    Re-evaluation: Patient is arriving to clinic status post left CATHIE performed on 2024. Patient has been consistent with attendance to therapy, motivated during each treatment session, and shows compliance with HEP. At this time patient has progressed very well as her ambulatory quality has normalized as has her tolerance to activity around the home. At this time patient has achieved all established goals. Patient is appropriate for discharge at this time.    Goals  Short term goals: 4 weeks from surgery  1. Improve  details… walking tolerance to 15 minutes to perform household activity  2. Patient to be able to negotiate 5 stairs with pain 3/10 pain or less  3. Patient will be able to perform sit to stand transfers from low chair without increased pain  4. Patient to reduce pain to 4/10 at its worst to improve activity tolerance    Long term goals: 12 weeks from surgery  1. Improve walking tolerance to 30 minutes   2. Patient to improve hip range of motion to normal limits  3. Patient to improve proximal hip strength to 4+/5   4. Patient to reduce pain to 1/10 at its worst to improve QOL and return to function      Plan    Planned therapy interventions: home exercise program    Treatment plan discussed with: patient    Subjective Evaluation    History of Present Illness  Date of surgery: 6/27/2024  Mechanism of injury: surgery  Mechanism of injury: Initial eval: Patient reports two weeks ago she got bumped by her dog resulting in a fall onto her left side noting she did have pain in her hip however notes that she carried on for 2 days including a full day at work. Patient notes that pain persisted therefore her  called the ambulance where she was taken to the hospital on June 24. It was discovered patient had a L hip Fx. Patient underwent L CATHIE via anterior approach on June 27, 2024. Patient was sent home from the hospital was referred to OPPT. At this time patient notes pain is managed well. Patient notes she is managing well at home with stairs and function around the home. Patient states she has been primarily ambulating with a RW.    Discharge: Patient reports at this time she feels great, noting that her function is not impaired with daily activities. Patient reports she is even able to get up off the ground without much difficulty. Patient notes that she does have to work on endurance but has been able to walk up to 4 miles           Recurrent probem    Patient Goals  Patient goals for therapy: decreased pain, increased  strength, increased motion and return to work  Patient goal: Make left leg as strong as right leg  Pain  Current pain ratin  At best pain ratin  At worst pain ratin  Location: L hip  Quality: tight  Exacerbated by: Doing too much.  Progression: resolved    Social Support  Steps to enter house: yes  Stairs in house: yes   Lives with: spouse      Diagnostic Tests  CT scan: abnormal  Treatments  No previous or current treatments    Objective      MMT:    Right  Left    Hip Flexion:   4+/5  4+/5  Hip Abduction:   4+/5  4+/5  Hip Adduction:   5/5  5/5  Hip Extension:   NT/5  NT/5  Knee Flexion:   5/5  5/5  Knee Extension:  5/5  5/5  Ankle Dorsiflexion:  5/5  5/5  Ankle Plantarflexion:  5/5  5/5    AROM:   Right  Left    Hip Flexion:   120  120  Hip Internal Rotation:  20  20  Hip External Rotation:  40  45  Hip Abduction:   40  45    SENSATION:  Intact to light touch    GAIT:  Patient with no major gait defects    TU seconds with RW    5xSTS:  9 seconds    SPECIAL TESTS:  (-) hoffmans     Incision:   Closed         Precautions: No past medical history on file.    DOS: 2024  L hip CATHIE anterior approach following Fx    Date: 2024 9/3/2024 9/10/2024 2024 2024 2024   Visit # 13 15 17 18 19 11   Manuals         L hip stretching AD AD AD AD TC AD                              Neuro Re-Ed         LAQ ---- (L) 3# 2 x 10 reps (L) 3# 2 x 10 reps -----  (L) 3#  10x    BKFO --- Clamshells GTB 10/10 ---- ----     Bridge  Supine 15x each w/ ball squeeze & pilates wheel abd 10x w/ add squeeze; 10x w/ GTB abd 10x w/ add squeeze; 10x w/ GTB abd 20x w/ add squeeze; 20x w/ GTB abd 20x w/ add squeeze Supine 10x each w/ ball squeeze & GTB abd       Flasher lateral gait @#8 - 2 x 10 reps  Flasher lateral gait @#8 - 2 x 10 reps      Standing marching on round foam 10 x 5 sec hold R UE support   3# (L/R) ----- Supine marching w/ GTB x 20 reps  Supine marching w/ GTB x 20 reps   Standing marching on round  "foam 10 x 5 sec hold R UE support   3# (L)    Kountze biceps/triceps @#10 x 20 reps standing on round foam  Fay bicep / tricep 10 # x 20 reps on foam Kountze bicep / tricep 11 # x 20 reps on foam Kountze bicep / tricep 11 # x 20 reps on foam Marching on foam 20x R/L       Standing squats (holding back of bike) x 10 reps  Standing squats (holding fay bar) x 20 reps  Standing squats (holding fay bar) x 20 reps  Kountze biceps/triceps @#8 x 20 reps standing on round foam    Ther Ex         Bike  7 min L4 seat # 5 10 min L4 10 min L4 10 min L4 10 min L4 Bike x 10 min L3 seat #6   STS 18\" mat 2 x 10 reps holding 10# TT 18\" mat x 20 reps holding 10# TT low mat x 10 reps holding 10# TT (mini squats) low mat x 20 reps holding 10# TT (mini squats) low mat x 20 reps holding 10# TT (mini squats) 18\" mat 2 x 10 reps holding 10# TT   Heel raises --- 20x on 1/2 roll ----- ----  ----   Standing hip abduction/ext 20x each (standing behind bike) 2 UE support - standing on round foam  3# (L/R) ----- ----- ------  20x each (standing behind bike) 2 UE support - standing on round foam  3# (L)   Adduction ball squeeze ---- (W/ bridging) (W/ bridging) -----      SLR 10x, 15x -   3#  SLR 3# x 20x reps  SLR 3# x 20x reps  SLR 3# x 20x reps  SLR 3# x 20x reps  SLR 2 x 10 reps  3#    Lat walks   -----       FSU/FSD      ----    Leg press (L) 45# 3 x 10 reps seat 8 Leg press left 49# 30x seat 10 Leg press left 49# 30x seat 10 Leg press left 51# 30x seat 10 Leg press left 55# 30x seat 5 Leg press (L) 35# 2 x 10 reps seat 8   Ther Activity                           Gait Training   ----   ----                     Modalities               ---              Access Code: [email or web address]  URL: [email or web address]  Date: 07/10/2024  Prepared by: Abebe Arellano    Exercises  - Seated Long Arc Quad  - 1 x daily - 7 x weekly - 2 sets - 10 reps  - Seated Hip Adduction Isometrics with Ball  - 1 x daily - 7 x weekly - 2 sets - 10 reps  - Heel " Raises with Counter Support  - 1 x daily - 7 x weekly - 2 sets - 10 reps  - Standing Hip Abduction with Counter Support  - 1 x daily - 7 x weekly - 2 sets - 10 reps  - Standing Knee Flexion with Counter Support  - 1 x daily - 7 x weekly - 2 sets - 10 reps

## 2024-09-19 NOTE — LETTER
September 19, 2024     Patient: Liliana Zheng  YOB: 1960  Date of Visit: 9/19/2024      To Whom it May Concern:    Liliana Zheng is under my professional care. Liliana was seen in my office on 9/19/2024. Liliana may return to work on 10/03/2024 without restriction.    If you have any questions or concerns, please don't hesitate to call.         Sincerely,          Twan Cueva,         CC: No Recipients

## 2024-09-19 NOTE — PROGRESS NOTES
Assessment/Plan:  1. Status post total replacement of left hip  XR hip/pelv 2-3 vws left if performed      2. Left displaced femoral neck fracture (HCC)          Scribe Attestation      I,:  Geraldo Durbin am acting as a scribe while in the presence of the attending physician.:       I,:  Twan Cueva, DO personally performed the services described in this documentation    as scribed in my presence.:           Liliana is a pleasant 63-year-old female who returns today for follow-up evaluation 3 months status post left total hip arthroplasty performed for femoral neck fracture.  I am very pleased with her imaging and her clinical presentation today in the office.  She may continue returning to activity to her tolerance.  She understands that she requires prophylactic antibiotics prior to any dental procedure moving forward.  A note was provided for her employer today to return to work on 10/3/2024 without restriction.  All of her questions and concerns were addressed today.  We will see her back in 9 months for her anniversary visit with new x-ray on arrival.    Subjective: Follow-up evaluation 3-month status post left total hip arthroplasty    Patient ID: Liliana Zheng is a 63 y.o. female who returns today for follow-up evaluation 3-month status post left total hip arthroplasty.  She reports that she has been doing well.  She has been discharged from physical therapy.  She has been returning to activity and denies any significant pain about her hip or groin.  She denies any limitation with her activity.  She is pleased with her progress.  She denies any new injury or trauma.    Review of Systems   Constitutional:  Positive for activity change. Negative for chills, fever and unexpected weight change.   HENT:  Negative for hearing loss, nosebleeds and sore throat.    Eyes:  Negative for pain, redness and visual disturbance.   Respiratory:  Negative for cough, shortness of breath and wheezing.     Cardiovascular:  Negative for chest pain, palpitations and leg swelling.   Gastrointestinal:  Negative for abdominal pain, nausea and vomiting.   Endocrine: Negative for polydipsia and polyuria.   Genitourinary:  Negative for dysuria and hematuria.   Musculoskeletal:  Negative for arthralgias, joint swelling and myalgias.        See HPI   Skin:  Negative for rash and wound.   Neurological:  Negative for dizziness, numbness and headaches.   Psychiatric/Behavioral:  Negative for decreased concentration and suicidal ideas. The patient is not nervous/anxious.          History reviewed. No pertinent past medical history.    Past Surgical History:   Procedure Laterality Date    ARTHROPLASTY HIP TOTAL ANTERIOR Left 6/27/2024    Procedure: ARTHROPLASTY HIP TOTAL ANTERIOR;  Surgeon: Twan Cueva DO;  Location: [facility] OR;  Service: Orthopedics       Family History   Problem Relation Age of Onset    Stroke Mother     Heart attack Father        Social History     Occupational History    Not on file   Tobacco Use    Smoking status: Never     Passive exposure: Never    Smokeless tobacco: Never   Vaping Use    Vaping status: Never Used   Substance and Sexual Activity    Alcohol use: Not Currently    Drug use: Not Currently    Sexual activity: Not on file         Current Outpatient Medications:     aspirin 325 mg tablet, Take 1 tablet (325 mg total) by mouth 2 (two) times a day, Disp: 84 tablet, Rfl: 0    Calcium Carb-Cholecalciferol (calcium carbonate-vitamin D) 500 mg-5 mcg tablet, Take 1 tablet by mouth daily with breakfast, Disp: 30 tablet, Rfl: 0    No Known Allergies    Objective:  Vitals:    09/19/24 1443   BP: 142/88   Pulse: 94       Body mass index is 23.69 kg/m².    Left Hip Exam     Tenderness   The patient is experiencing no tenderness.     Range of Motion   Abduction:  50   Adduction:  30   Flexion:  130   External rotation:  50   Internal rotation: 20     Muscle Strength   Flexion: 5/5     Tests   JOSE:  negative    Other   Erythema: absent  Scars: present  Sensation: normal  Pulse: present    Comments:  Well healed anterior surgical scar  Stinchfield: negative  FADIR: negative            Physical Exam  Vitals and nursing note reviewed.   Constitutional:       Appearance: Normal appearance. She is well-developed.   HENT:      Head: Normocephalic and atraumatic.      Right Ear: External ear normal.      Left Ear: External ear normal.   Eyes:      General: No scleral icterus.     Extraocular Movements: Extraocular movements intact.      Conjunctiva/sclera: Conjunctivae normal.   Cardiovascular:      Rate and Rhythm: Normal rate.   Pulmonary:      Effort: Pulmonary effort is normal. No respiratory distress.   Musculoskeletal:      Cervical back: Normal range of motion and neck supple.      Comments: See Ortho exam   Skin:     General: Skin is warm and dry.   Neurological:      General: No focal deficit present.      Mental Status: She is alert and oriented to person, place, and time.   Psychiatric:         Behavior: Behavior normal.         I have personally reviewed pertinent films in PACS.    X-ray of the left hip obtained on 9/19/2024 reviewed demonstrating a well-positioned and aligned total hip arthroplasty and cerclage cable without evidence of failure.  There is no new fracture, dislocation, lytic or blastic lesion.    This document was created using speech voice recognition software.   Grammatical errors, random word insertions, pronoun errors, and incomplete sentences are an occasional consequence of this system due to software limitations, ambient noise, and hardware issues.   Any formal questions or concerns about content, text, or information contained within the body of this dictation should be directly addressed to the provider for clarification.

## 2025-01-16 ENCOUNTER — TELEPHONE (OUTPATIENT)
Dept: PAIN MEDICINE | Facility: CLINIC | Age: 65
End: 2025-01-16

## 2025-04-07 LAB
DME PARACHUTE DELIVERY DATE ACTUAL: NORMAL
DME PARACHUTE DELIVERY DATE ACTUAL: NORMAL
DME PARACHUTE DELIVERY DATE REQUESTED: NORMAL
DME PARACHUTE DELIVERY DATE REQUESTED: NORMAL
DME PARACHUTE ITEM DESCRIPTION: NORMAL
DME PARACHUTE ITEM DESCRIPTION: NORMAL
DME PARACHUTE ORDER STATUS: NORMAL
DME PARACHUTE ORDER STATUS: NORMAL
DME PARACHUTE SUPPLIER NAME: NORMAL
DME PARACHUTE SUPPLIER NAME: NORMAL
DME PARACHUTE SUPPLIER PHONE: NORMAL
DME PARACHUTE SUPPLIER PHONE: NORMAL

## 2025-06-19 ENCOUNTER — APPOINTMENT (OUTPATIENT)
Dept: RADIOLOGY | Facility: CLINIC | Age: 65
End: 2025-06-19
Attending: ORTHOPAEDIC SURGERY
Payer: COMMERCIAL

## 2025-06-19 ENCOUNTER — OFFICE VISIT (OUTPATIENT)
Dept: OBGYN CLINIC | Facility: CLINIC | Age: 65
End: 2025-06-19
Payer: COMMERCIAL

## 2025-06-19 VITALS — HEIGHT: 68 IN | BODY MASS INDEX: 23.34 KG/M2 | WEIGHT: 154 LBS

## 2025-06-19 DIAGNOSIS — Z96.642 AFTERCARE FOLLOWING LEFT HIP JOINT REPLACEMENT SURGERY: ICD-10-CM

## 2025-06-19 DIAGNOSIS — Z47.1 AFTERCARE FOLLOWING LEFT HIP JOINT REPLACEMENT SURGERY: ICD-10-CM

## 2025-06-19 DIAGNOSIS — Z96.642 STATUS POST TOTAL REPLACEMENT OF LEFT HIP: Primary | ICD-10-CM

## 2025-06-19 DIAGNOSIS — Z96.642 STATUS POST TOTAL REPLACEMENT OF LEFT HIP: ICD-10-CM

## 2025-06-19 PROCEDURE — 73502 X-RAY EXAM HIP UNI 2-3 VIEWS: CPT

## 2025-06-19 PROCEDURE — 99214 OFFICE O/P EST MOD 30 MIN: CPT | Performed by: ORTHOPAEDIC SURGERY

## 2025-06-19 NOTE — PROGRESS NOTES
"Name: Liliana Zheng      : 1960      MRN: [id number]  Encounter Provider: Twan Cueva DO  Encounter Date: 2025   Encounter department: [facility]  :  Assessment & Plan  Status post total replacement of left hip  Aftercare following left hip joint replacement surgery      Orders:    XR hip/pelv 2-3 vws left if performed; Future         Liliana Zheng is a pleasant 64 y.o. female who returns today for follow-up evaluation 1 year status post left total hip arthroplasty for fracture.  I am pleased with her imaging and her clinical presentation today in the office.  She may continue with all activity to her tolerance, including yoga.  She understands the continued need for prophylactic antibiotics prior to any dental work moving forward.  All of her questions and concerns were addressed today.  Will see her back as needed.    Return if symptoms worsen or fail to improve.    I have personally reviewed pertinent imaging.  X-ray of the left hip obtained on 2025 reviewed demonstrating a well-positioned and aligned total hip arthroplasty without evidence of failure.  There is no new fracture, dislocation, lytic or blastic lesion.    History: Liliana Zheng is a 64 y.o. female who returns today for follow-up evaluation 1 year status post left total hip arthroplasty. She has been doing well. She has been performing all desired activity without limitation or significant pain in the hip. She is pleased with her outcome. She would like to begin yoga. She has been compliant with using prophylactic antibiotics for dental work. She denies any new injury or trauma.       Estimated body mass index is 23.42 kg/m² as calculated from the following:    Height as of this encounter: 5' 8\" (1.727 m).    Weight as of this encounter: 69.9 kg (154 lb).    No results found for: \"HGBA1C\"    Social History     Occupational History    Not on file   Tobacco Use    Smoking status: Never "     Passive exposure: Never    Smokeless tobacco: Never   Vaping Use    Vaping status: Never Used   Substance and Sexual Activity    Alcohol use: Not Currently    Drug use: Not Currently    Sexual activity: Not on file       Objective:  Left Hip Exam     Tenderness   The patient is experiencing no tenderness.     Range of Motion   Abduction:  50   Adduction:  30   Flexion:  130   External rotation:  50   Internal rotation: 20     Muscle Strength   Flexion: 5/5     Tests   JOSE: negative    Other   Erythema: absent  Scars: present  Sensation: normal  Pulse: present    Comments:  Well healed anterior surgical scar  Stinchfield: negative  FADIR: negative            There were no vitals filed for this visit.    Subjective:  Past Medical History[1]    Past Surgical History[2]    Family History[3]    Current Medications[4]    Allergies[5]    Review of Systems   Constitutional:  Positive for activity change. Negative for chills, fever and unexpected weight change.   HENT:  Negative for hearing loss, nosebleeds and sore throat.    Eyes:  Negative for pain, redness and visual disturbance.   Respiratory:  Negative for cough, shortness of breath and wheezing.    Cardiovascular:  Negative for chest pain, palpitations and leg swelling.   Gastrointestinal:  Negative for abdominal pain, nausea and vomiting.   Endocrine: Negative for polydipsia and polyuria.   Genitourinary:  Negative for dysuria and hematuria.   Musculoskeletal:  See HPI  Skin:  Negative for rash and wound.   Neurological:  Negative for dizziness, numbness and headaches.   Psychiatric/Behavioral:  Negative for decreased concentration and suicidal ideas. The patient is not nervous/anxious.      Physical Exam  Vitals and nursing note reviewed.   Constitutional:       Appearance: Normal appearance. She is well-developed.   HENT:      Head: Normocephalic and atraumatic.      Right Ear: External ear normal.      Left Ear: External ear normal.   Eyes:      General: No  scleral icterus.     Extraocular Movements: Extraocular movements intact.      Conjunctiva/sclera: Conjunctivae normal.   Cardiovascular:      Rate and Rhythm: Normal rate.   Pulmonary:      Effort: Pulmonary effort is normal. No respiratory distress.   Musculoskeletal:      Cervical back: Normal range of motion and neck supple.      Comments: See Ortho exam   Skin:     General: Skin is warm and dry.   Neurological:      General: No focal deficit present.      Mental Status: She is alert and oriented to person, place, and time.   Psychiatric:         Behavior: Behavior normal.     Scribe Attestation      I,:  Geraldo Durbin am acting as a scribe while in the presence of the attending physician.:       I,:  Twan Cueva DO personally performed the services described in this documentation    as scribed in my presence.:           This document was created using speech voice recognition software.   Grammatical errors, random word insertions, pronoun errors, and incomplete sentences are an occasional consequence of this system due to software limitations, ambient noise, and hardware issues.   Any formal questions or concerns about content, text, or information contained within the body of this dictation should be directly addressed to the provider for clarification.         [1] No past medical history on file.  [2]   Past Surgical History:  Procedure Laterality Date    ARTHROPLASTY HIP TOTAL ANTERIOR Left 6/27/2024    Procedure: ARTHROPLASTY HIP TOTAL ANTERIOR;  Surgeon: Twan Cueva DO;  Location: [facility];  Service: Orthopedics   [3]   Family History  Problem Relation Name Age of Onset    Stroke Mother      Heart attack Father     [4]   Current Outpatient Medications:     aspirin 325 mg tablet, Take 1 tablet (325 mg total) by mouth 2 (two) times a day, Disp: 84 tablet, Rfl: 0    Calcium Carb-Cholecalciferol (calcium carbonate-vitamin D) 500 mg-5 mcg tablet, Take 1 tablet by mouth daily with breakfast, Disp: 30  tablet, Rfl: 0  [5] No Known Allergies

## (undated) DEVICE — 3/8 INCH SMOKE TUBING

## (undated) DEVICE — ARTHROSCOPY FLOOR MAT

## (undated) DEVICE — PACK MAJOR ORTHO W/SPLITS PBDS

## (undated) DEVICE — SUT VICRYL 0 CP-1 27 IN J267H

## (undated) DEVICE — SUT ETHIBOND 2 V-37 30 IN MX69G

## (undated) DEVICE — TIBURON SPLIT SHEET: Brand: CONVERTORS

## (undated) DEVICE — DRAPE SHEET X-LG

## (undated) DEVICE — STERILE DOUBLE BASIN SET PACK: Brand: CARDINAL HEALTH

## (undated) DEVICE — FOOT SWITCH DRAPE: Brand: UNBRANDED

## (undated) DEVICE — GLOVE SRG BIOGEL 8

## (undated) DEVICE — VEST SURGEON DISPOSABLE

## (undated) DEVICE — HOOD: Brand: FLYTE, SURGICOOL

## (undated) DEVICE — 450 ML BOTTLE OF 0.05% CHLORHEXIDINE GLUCONATE IN 99.95% STERILE WATER FOR IRRIGATION, USP AND APPLICATOR.: Brand: IRRISEPT ANTIMICROBIAL WOUND LAVAGE

## (undated) DEVICE — 3M™ IOBAN™ 2 ANTIMICROBIAL INCISE DRAPE 6650EZ: Brand: IOBAN™ 2

## (undated) DEVICE — DRAPE C-ARM X-RAY

## (undated) DEVICE — PAD CAST 4 IN COTTON NON STERILE

## (undated) DEVICE — GLOVE INDICATOR PI UNDERGLOVE SZ 7.5 BLUE

## (undated) DEVICE — CHLORAPREP HI-LITE 26ML ORANGE

## (undated) DEVICE — ANTIBACTERIAL UNDYED BRAIDED (POLYGLACTIN 910), SYNTHETIC ABSORBABLE SUTURE: Brand: COATED VICRYL

## (undated) DEVICE — ADHESIVE SKIN HIGH VISCOSITY EXOFIN 1ML

## (undated) DEVICE — HANDPIECE SET WITH HIGH FLOW TIP AND SUCTION TUBE: Brand: INTERPULSE

## (undated) DEVICE — DRESSING MEPILEX AG BORDER POST-OP 4 X 8 IN

## (undated) DEVICE — NEPTUNE E-SEP SMOKE EVACUATION PENCIL, COATED, 70MM BLADE, PUSH BUTTON SWITCH: Brand: NEPTUNE E-SEP

## (undated) DEVICE — ASTOUND SURGICAL GOWN, XXX LARGE, X-LONG: Brand: CONVERTORS

## (undated) DEVICE — INSTRUMENT POUCH: Brand: CONVERTORS

## (undated) DEVICE — BIPOLAR SEALER 23-113-1 AQM 2.3: Brand: AQUAMANTYS™

## (undated) DEVICE — INTENDED FOR TISSUE SEPARATION, AND OTHER PROCEDURES THAT REQUIRE A SHARP SURGICAL BLADE TO PUNCTURE OR CUT.: Brand: BARD-PARKER ® CARBON RIB-BACK BLADES

## (undated) DEVICE — MICRO HVTSA, 0.5G AND HVTSA SOURCEMARK PRODUCT CODE M1206 AND M1206-01: Brand: EXOFIN MICRO HVTSA, 0.5G

## (undated) DEVICE — OSCILLATING TIP SAW CARTRIDGE: Brand: PRECISION FALCON

## (undated) DEVICE — SUT STRATFIX SPIRAL PDS PLUS 1 CT-1/CT-1 12IN SXPP2B403

## (undated) DEVICE — ELECTRODE BLADE E-Z CLEAN 6.5IN -0014

## (undated) DEVICE — TUBE MINI KAMVAC SUCTION 7310 7 LATEX FREE

## (undated) DEVICE — DRESSING MEPILEX AG BORDER 4 X 8 IN

## (undated) DEVICE — SUT STRATAFIX SPIRAL 3-0 PGA/PCL 30 X 30 CM SXMD2B410

## (undated) DEVICE — GLOVE INDICATOR PI UNDERGLOVE SZ 8.5 BLUE

## (undated) DEVICE — CAPIT HIP COP -CMNT/POR-ACTIS

## (undated) DEVICE — WEBRIL 6 IN UNSTERILE

## (undated) DEVICE — GLOVE SRG BIOGEL 8.5

## (undated) DEVICE — DRESSING MEPILEX BORDER 4 X 8 IN

## (undated) DEVICE — 3M™ STERI-DRAPE™ U-DRAPE 1015: Brand: STERI-DRAPE™

## (undated) DEVICE — POSITIONER HANA TABLE PACK

## (undated) DEVICE — SKIN MARKER DUAL TIP WITH RULER CAP, FLEXIBLE RULER AND LABELS: Brand: DEVON